# Patient Record
Sex: MALE | Race: WHITE | Employment: OTHER | URBAN - NONMETROPOLITAN AREA
[De-identification: names, ages, dates, MRNs, and addresses within clinical notes are randomized per-mention and may not be internally consistent; named-entity substitution may affect disease eponyms.]

---

## 2017-04-03 PROBLEM — E78.2 MIXED HYPERLIPIDEMIA: Status: ACTIVE | Noted: 2017-04-03

## 2017-04-03 PROBLEM — R55 NEAR SYNCOPE: Status: ACTIVE | Noted: 2017-04-03

## 2017-04-03 PROBLEM — I51.7 MILD LEFT ATRIAL ENLARGEMENT: Status: ACTIVE | Noted: 2017-04-03

## 2017-04-03 PROBLEM — I51.7 MILD RIGHT ATRIAL ENLARGEMENT: Status: ACTIVE | Noted: 2017-04-03

## 2017-04-03 PROBLEM — E11.9 DIABETES MELLITUS TYPE 2, DIET-CONTROLLED (HCC): Status: ACTIVE | Noted: 2017-04-03

## 2017-04-03 PROBLEM — I34.0 MILD MITRAL REGURGITATION BY PRIOR ECHOCARDIOGRAM: Status: ACTIVE | Noted: 2017-04-03

## 2017-04-03 PROBLEM — I48.19 PERSISTENT ATRIAL FIBRILLATION (HCC): Status: ACTIVE | Noted: 2017-04-03

## 2017-05-05 PROBLEM — N13.8 BENIGN PROSTATIC HYPERPLASIA WITH URINARY OBSTRUCTION: Status: ACTIVE | Noted: 2017-03-01

## 2017-05-05 PROBLEM — N28.1 KIDNEY CYSTS: Status: ACTIVE | Noted: 2017-03-01

## 2017-05-05 PROBLEM — N40.1 BENIGN PROSTATIC HYPERPLASIA WITH URINARY OBSTRUCTION: Status: ACTIVE | Noted: 2017-03-01

## 2017-05-23 PROBLEM — I51.7 MILD LEFT ATRIAL ENLARGEMENT: Chronic | Status: ACTIVE | Noted: 2017-04-03

## 2017-05-23 PROBLEM — E11.9 DIABETES MELLITUS TYPE 2, DIET-CONTROLLED (HCC): Chronic | Status: ACTIVE | Noted: 2017-04-03

## 2017-05-23 PROBLEM — I34.0 MILD MITRAL REGURGITATION BY PRIOR ECHOCARDIOGRAM: Chronic | Status: ACTIVE | Noted: 2017-04-03

## 2017-05-23 PROBLEM — R55 NEAR SYNCOPE: Chronic | Status: ACTIVE | Noted: 2017-04-03

## 2017-05-23 PROBLEM — I48.19 PERSISTENT ATRIAL FIBRILLATION (HCC): Chronic | Status: ACTIVE | Noted: 2017-04-03

## 2017-05-23 PROBLEM — E78.2 MIXED HYPERLIPIDEMIA: Chronic | Status: ACTIVE | Noted: 2017-04-03

## 2017-05-23 PROBLEM — I51.7 MILD RIGHT ATRIAL ENLARGEMENT: Chronic | Status: ACTIVE | Noted: 2017-04-03

## 2017-05-23 PROBLEM — N28.1 KIDNEY CYSTS: Chronic | Status: ACTIVE | Noted: 2017-03-01

## 2017-05-25 PROBLEM — K74.60 CIRRHOSIS OF LIVER (HCC): Status: ACTIVE | Noted: 2017-05-25

## 2017-10-23 PROBLEM — I51.7 ATRIAL ENLARGEMENT, BILATERAL: Status: ACTIVE | Noted: 2017-10-23

## 2017-10-23 PROBLEM — I34.0 NON-RHEUMATIC MITRAL REGURGITATION: Status: ACTIVE | Noted: 2017-10-23

## 2017-10-23 PROBLEM — R93.1 ABNORMAL ECHOCARDIOGRAM: Status: ACTIVE | Noted: 2017-10-23

## 2018-05-02 PROBLEM — I48.0 PAF (PAROXYSMAL ATRIAL FIBRILLATION) (HCC): Status: ACTIVE | Noted: 2018-05-02

## 2019-05-03 PROBLEM — R07.9 CHEST PAIN: Status: ACTIVE | Noted: 2019-05-03

## 2019-09-23 PROBLEM — I27.20 PULMONARY HYPERTENSION (HCC): Status: ACTIVE | Noted: 2019-09-23

## 2019-09-23 PROBLEM — Z79.899 LONG TERM CURRENT USE OF ANTIARRHYTHMIC DRUG: Status: ACTIVE | Noted: 2019-09-23

## 2019-09-23 PROBLEM — I48.19 PERSISTENT ATRIAL FIBRILLATION (HCC): Chronic | Status: RESOLVED | Noted: 2017-04-03 | Resolved: 2019-09-23

## 2019-09-23 PROBLEM — Z79.01 LONG TERM (CURRENT) USE OF ANTICOAGULANTS: Status: ACTIVE | Noted: 2019-09-23

## 2019-09-23 PROBLEM — I34.0 MILD MITRAL REGURGITATION BY PRIOR ECHOCARDIOGRAM: Chronic | Status: RESOLVED | Noted: 2017-04-03 | Resolved: 2019-09-23

## 2019-09-23 PROBLEM — I49.3 PVC (PREMATURE VENTRICULAR CONTRACTION): Status: ACTIVE | Noted: 2019-09-23

## 2019-09-23 PROBLEM — Z95.818 STATUS POST PLACEMENT OF IMPLANTABLE LOOP RECORDER: Status: ACTIVE | Noted: 2019-09-23

## 2020-05-26 PROBLEM — I95.0 IDIOPATHIC HYPOTENSION: Status: ACTIVE | Noted: 2020-05-26

## 2024-05-15 ENCOUNTER — TRANSCRIBE ORDERS (OUTPATIENT)
Dept: ADMINISTRATIVE | Age: 79
End: 2024-05-15

## 2024-05-15 ENCOUNTER — HOSPITAL ENCOUNTER (OUTPATIENT)
Age: 79
Discharge: HOME | End: 2024-05-15
Payer: MEDICARE

## 2024-05-15 ENCOUNTER — HOSPITAL ENCOUNTER (OUTPATIENT)
Dept: CT IMAGING | Age: 79
Discharge: HOME | End: 2024-05-17
Attending: FAMILY MEDICINE
Payer: MEDICARE

## 2024-05-15 DIAGNOSIS — R18.8 OTHER ASCITES: ICD-10-CM

## 2024-05-15 DIAGNOSIS — R18.8 OTHER ASCITES: Primary | ICD-10-CM

## 2024-05-15 PROCEDURE — 80048 BASIC METABOLIC PNL TOTAL CA: CPT

## 2024-05-15 PROCEDURE — 36415 COLL VENOUS BLD VENIPUNCTURE: CPT

## 2024-05-15 PROCEDURE — 84443 ASSAY THYROID STIM HORMONE: CPT

## 2024-05-15 PROCEDURE — 74176 CT ABD & PELVIS W/O CONTRAST: CPT

## 2024-05-15 PROCEDURE — 84439 ASSAY OF FREE THYROXINE: CPT

## 2024-05-16 LAB
ANION GAP SERPL CALCULATED.3IONS-SCNC: 10 MMOL/L (ref 9–16)
BUN SERPL-MCNC: 15 MG/DL (ref 8–23)
CALCIUM SERPL-MCNC: 7.5 MG/DL (ref 8.6–10.4)
CHLORIDE SERPL-SCNC: 106 MMOL/L (ref 98–107)
CO2 SERPL-SCNC: 22 MMOL/L (ref 20–31)
CREAT SERPL-MCNC: 1.2 MG/DL (ref 0.7–1.2)
GFR, ESTIMATED: 64 ML/MIN/1.73M2
GLUCOSE SERPL-MCNC: 109 MG/DL (ref 74–99)
POTASSIUM SERPL-SCNC: 4.1 MMOL/L (ref 3.7–5.3)
SODIUM SERPL-SCNC: 138 MMOL/L (ref 136–145)
T4 FREE SERPL-MCNC: 1.7 NG/DL (ref 0.92–1.68)
TSH SERPL DL<=0.05 MIU/L-ACNC: 3.51 UIU/ML (ref 0.27–4.2)

## 2024-05-20 ENCOUNTER — HOSPITAL ENCOUNTER (INPATIENT)
Age: 79
LOS: 4 days | Discharge: HOME | DRG: 292 | End: 2024-05-24
Attending: EMERGENCY MEDICINE | Admitting: STUDENT IN AN ORGANIZED HEALTH CARE EDUCATION/TRAINING PROGRAM
Payer: MEDICARE

## 2024-05-20 ENCOUNTER — APPOINTMENT (OUTPATIENT)
Dept: GENERAL RADIOLOGY | Age: 79
DRG: 292 | End: 2024-05-20
Payer: MEDICARE

## 2024-05-20 DIAGNOSIS — K74.60 CIRRHOSIS OF LIVER WITH ASCITES, UNSPECIFIED HEPATIC CIRRHOSIS TYPE (HCC): ICD-10-CM

## 2024-05-20 DIAGNOSIS — I50.21 ACUTE SYSTOLIC CONGESTIVE HEART FAILURE (HCC): Primary | ICD-10-CM

## 2024-05-20 DIAGNOSIS — R60.0 BILATERAL LOWER EXTREMITY EDEMA: ICD-10-CM

## 2024-05-20 DIAGNOSIS — R18.8 CIRRHOSIS OF LIVER WITH ASCITES, UNSPECIFIED HEPATIC CIRRHOSIS TYPE (HCC): ICD-10-CM

## 2024-05-20 PROBLEM — R60.1 ANASARCA: Status: ACTIVE | Noted: 2024-05-20

## 2024-05-20 PROBLEM — I95.89 CHRONIC HYPOTENSION: Status: ACTIVE | Noted: 2024-05-20

## 2024-05-20 PROBLEM — E87.6 HYPOKALEMIA: Status: ACTIVE | Noted: 2024-05-20

## 2024-05-20 PROBLEM — E03.9 HYPOTHYROIDISM: Status: ACTIVE | Noted: 2024-05-20

## 2024-05-20 PROBLEM — I48.91 ATRIAL FIBRILLATION (HCC): Status: ACTIVE | Noted: 2024-05-20

## 2024-05-20 PROBLEM — I50.23 ACUTE ON CHRONIC CLINICAL SYSTOLIC HEART FAILURE (HCC): Status: ACTIVE | Noted: 2024-05-20

## 2024-05-20 LAB
ALBUMIN SERPL-MCNC: 2.9 G/DL (ref 3.5–5.2)
ALBUMIN/GLOB SERPL: 1.2 {RATIO} (ref 1–2.5)
ALP SERPL-CCNC: 165 U/L (ref 40–129)
ALT SERPL-CCNC: 20 U/L (ref 5–41)
ANION GAP SERPL CALCULATED.3IONS-SCNC: 9 MMOL/L (ref 9–17)
AST SERPL-CCNC: 30 U/L
BASOPHILS # BLD: 0.04 K/UL (ref 0–0.2)
BASOPHILS NFR BLD: 1 % (ref 0–2)
BILIRUB SERPL-MCNC: 0.3 MG/DL (ref 0.3–1.2)
BNP SERPL-MCNC: 5021 PG/ML
BUN SERPL-MCNC: 13 MG/DL (ref 8–23)
CALCIUM SERPL-MCNC: 7.3 MG/DL (ref 8.6–10.4)
CHLORIDE SERPL-SCNC: 105 MMOL/L (ref 98–107)
CO2 SERPL-SCNC: 31 MMOL/L (ref 20–31)
CREAT SERPL-MCNC: 1.2 MG/DL (ref 0.7–1.2)
EOSINOPHIL # BLD: 0.04 K/UL (ref 0–0.4)
EOSINOPHILS RELATIVE PERCENT: 1 % (ref 1–4)
ERYTHROCYTE [DISTWIDTH] IN BLOOD BY AUTOMATED COUNT: 16.1 % (ref 12.5–15.4)
GFR, ESTIMATED: 61 ML/MIN/1.73M2
GLUCOSE SERPL-MCNC: 108 MG/DL (ref 70–99)
HCT VFR BLD AUTO: 26.7 % (ref 41–53)
HGB BLD-MCNC: 8.9 G/DL (ref 13.5–17.5)
LYMPHOCYTES NFR BLD: 0.22 K/UL (ref 1–4.8)
LYMPHOCYTES RELATIVE PERCENT: 5 % (ref 24–44)
MCH RBC QN AUTO: 30.1 PG (ref 26–34)
MCHC RBC AUTO-ENTMCNC: 33.3 G/DL (ref 31–37)
MCV RBC AUTO: 90.5 FL (ref 80–100)
MONOCYTES NFR BLD: 0.31 K/UL (ref 0.1–0.8)
MONOCYTES NFR BLD: 7 % (ref 1–7)
MORPHOLOGY: NORMAL
NEUTROPHILS NFR BLD: 86 % (ref 36–66)
NEUTS SEG NFR BLD: 3.79 K/UL (ref 1.8–7.7)
PLATELET # BLD AUTO: 225 K/UL (ref 140–450)
PMV BLD AUTO: 7.4 FL (ref 6–12)
POTASSIUM SERPL-SCNC: 2.8 MMOL/L (ref 3.7–5.3)
POTASSIUM SERPL-SCNC: 2.9 MMOL/L (ref 3.7–5.3)
PROT SERPL-MCNC: 5.3 G/DL (ref 6.4–8.3)
RBC # BLD AUTO: 2.95 M/UL (ref 4.5–5.9)
SODIUM SERPL-SCNC: 145 MMOL/L (ref 135–144)
TROPONIN I SERPL HS-MCNC: 24 NG/L (ref 0–22)
TROPONIN I SERPL HS-MCNC: 27 NG/L (ref 0–22)
WBC OTHER # BLD: 4.4 K/UL (ref 3.5–11)

## 2024-05-20 PROCEDURE — 99222 1ST HOSP IP/OBS MODERATE 55: CPT | Performed by: STUDENT IN AN ORGANIZED HEALTH CARE EDUCATION/TRAINING PROGRAM

## 2024-05-20 PROCEDURE — 6360000002 HC RX W HCPCS: Performed by: EMERGENCY MEDICINE

## 2024-05-20 PROCEDURE — 84484 ASSAY OF TROPONIN QUANT: CPT

## 2024-05-20 PROCEDURE — 80053 COMPREHEN METABOLIC PANEL: CPT

## 2024-05-20 PROCEDURE — 83880 ASSAY OF NATRIURETIC PEPTIDE: CPT

## 2024-05-20 PROCEDURE — 6360000002 HC RX W HCPCS: Performed by: STUDENT IN AN ORGANIZED HEALTH CARE EDUCATION/TRAINING PROGRAM

## 2024-05-20 PROCEDURE — 84132 ASSAY OF SERUM POTASSIUM: CPT

## 2024-05-20 PROCEDURE — 2580000003 HC RX 258: Performed by: STUDENT IN AN ORGANIZED HEALTH CARE EDUCATION/TRAINING PROGRAM

## 2024-05-20 PROCEDURE — 6370000000 HC RX 637 (ALT 250 FOR IP)

## 2024-05-20 PROCEDURE — 6370000000 HC RX 637 (ALT 250 FOR IP): Performed by: STUDENT IN AN ORGANIZED HEALTH CARE EDUCATION/TRAINING PROGRAM

## 2024-05-20 PROCEDURE — 96374 THER/PROPH/DIAG INJ IV PUSH: CPT

## 2024-05-20 PROCEDURE — 99285 EMERGENCY DEPT VISIT HI MDM: CPT

## 2024-05-20 PROCEDURE — 1210000000 HC MED SURG R&B

## 2024-05-20 PROCEDURE — 85025 COMPLETE CBC W/AUTO DIFF WBC: CPT

## 2024-05-20 PROCEDURE — 93005 ELECTROCARDIOGRAM TRACING: CPT

## 2024-05-20 PROCEDURE — 6360000002 HC RX W HCPCS

## 2024-05-20 PROCEDURE — 71045 X-RAY EXAM CHEST 1 VIEW: CPT

## 2024-05-20 PROCEDURE — 36415 COLL VENOUS BLD VENIPUNCTURE: CPT

## 2024-05-20 RX ORDER — MAGNESIUM SULFATE IN WATER 40 MG/ML
2000 INJECTION, SOLUTION INTRAVENOUS PRN
Status: DISCONTINUED | OUTPATIENT
Start: 2024-05-20 | End: 2024-05-24 | Stop reason: HOSPADM

## 2024-05-20 RX ORDER — AMIODARONE HYDROCHLORIDE 200 MG/1
200 TABLET ORAL DAILY
Status: DISCONTINUED | OUTPATIENT
Start: 2024-05-21 | End: 2024-05-20

## 2024-05-20 RX ORDER — PANTOPRAZOLE SODIUM 40 MG/1
40 TABLET, DELAYED RELEASE ORAL
Status: DISCONTINUED | OUTPATIENT
Start: 2024-05-21 | End: 2024-05-24 | Stop reason: HOSPADM

## 2024-05-20 RX ORDER — TAMSULOSIN HYDROCHLORIDE 0.4 MG/1
0.4 CAPSULE ORAL DAILY
Status: DISCONTINUED | OUTPATIENT
Start: 2024-05-20 | End: 2024-05-24 | Stop reason: HOSPADM

## 2024-05-20 RX ORDER — MIDODRINE HYDROCHLORIDE 5 MG/1
10 TABLET ORAL 3 TIMES DAILY
Status: DISCONTINUED | OUTPATIENT
Start: 2024-05-21 | End: 2024-05-24 | Stop reason: HOSPADM

## 2024-05-20 RX ORDER — ACETAMINOPHEN 325 MG/1
650 TABLET ORAL EVERY 6 HOURS PRN
Status: DISCONTINUED | OUTPATIENT
Start: 2024-05-20 | End: 2024-05-24 | Stop reason: HOSPADM

## 2024-05-20 RX ORDER — SODIUM CHLORIDE 9 MG/ML
INJECTION, SOLUTION INTRAVENOUS PRN
Status: DISCONTINUED | OUTPATIENT
Start: 2024-05-20 | End: 2024-05-24 | Stop reason: HOSPADM

## 2024-05-20 RX ORDER — LEVOTHYROXINE SODIUM 0.12 MG/1
125 TABLET ORAL DAILY
Status: ON HOLD | COMMUNITY
End: 2024-05-24 | Stop reason: HOSPADM

## 2024-05-20 RX ORDER — ONDANSETRON 4 MG/1
4 TABLET, ORALLY DISINTEGRATING ORAL EVERY 8 HOURS PRN
Status: DISCONTINUED | OUTPATIENT
Start: 2024-05-20 | End: 2024-05-24 | Stop reason: HOSPADM

## 2024-05-20 RX ORDER — FUROSEMIDE 10 MG/ML
40 INJECTION INTRAMUSCULAR; INTRAVENOUS ONCE
Status: COMPLETED | OUTPATIENT
Start: 2024-05-20 | End: 2024-05-20

## 2024-05-20 RX ORDER — GABAPENTIN 100 MG/1
100 CAPSULE ORAL 3 TIMES DAILY
Status: DISCONTINUED | OUTPATIENT
Start: 2024-05-20 | End: 2024-05-24 | Stop reason: HOSPADM

## 2024-05-20 RX ORDER — ATORVASTATIN CALCIUM 40 MG/1
40 TABLET, FILM COATED ORAL DAILY
Status: DISCONTINUED | COMMUNITY
Start: 2024-05-21 | End: 2024-05-24 | Stop reason: HOSPADM

## 2024-05-20 RX ORDER — POTASSIUM CHLORIDE 20 MEQ/1
20 TABLET, EXTENDED RELEASE ORAL
Status: DISCONTINUED | OUTPATIENT
Start: 2024-05-20 | End: 2024-05-24 | Stop reason: HOSPADM

## 2024-05-20 RX ORDER — SODIUM CHLORIDE 0.9 % (FLUSH) 0.9 %
5-40 SYRINGE (ML) INJECTION EVERY 12 HOURS SCHEDULED
Status: DISCONTINUED | OUTPATIENT
Start: 2024-05-20 | End: 2024-05-24 | Stop reason: HOSPADM

## 2024-05-20 RX ORDER — POTASSIUM CHLORIDE 7.45 MG/ML
10 INJECTION INTRAVENOUS PRN
Status: DISCONTINUED | OUTPATIENT
Start: 2024-05-20 | End: 2024-05-24 | Stop reason: HOSPADM

## 2024-05-20 RX ORDER — LEVOTHYROXINE SODIUM 0.05 MG/1
50 TABLET ORAL DAILY
Status: DISCONTINUED | OUTPATIENT
Start: 2024-05-21 | End: 2024-05-20

## 2024-05-20 RX ORDER — AMIODARONE HYDROCHLORIDE 200 MG/1
100 TABLET ORAL DAILY
Status: DISCONTINUED | OUTPATIENT
Start: 2024-05-21 | End: 2024-05-24 | Stop reason: HOSPADM

## 2024-05-20 RX ORDER — POTASSIUM CHLORIDE 7.45 MG/ML
10 INJECTION INTRAVENOUS ONCE
Status: COMPLETED | OUTPATIENT
Start: 2024-05-20 | End: 2024-05-20

## 2024-05-20 RX ORDER — ENOXAPARIN SODIUM 100 MG/ML
30 INJECTION SUBCUTANEOUS 2 TIMES DAILY
Status: DISCONTINUED | OUTPATIENT
Start: 2024-05-20 | End: 2024-05-20

## 2024-05-20 RX ORDER — LEVOTHYROXINE SODIUM 0.07 MG/1
75 TABLET ORAL DAILY
Status: DISCONTINUED | OUTPATIENT
Start: 2024-05-21 | End: 2024-05-24 | Stop reason: HOSPADM

## 2024-05-20 RX ORDER — ONDANSETRON 2 MG/ML
4 INJECTION INTRAMUSCULAR; INTRAVENOUS EVERY 6 HOURS PRN
Status: DISCONTINUED | OUTPATIENT
Start: 2024-05-20 | End: 2024-05-24 | Stop reason: HOSPADM

## 2024-05-20 RX ORDER — MIDODRINE HYDROCHLORIDE 5 MG/1
5 TABLET ORAL 3 TIMES DAILY
Status: DISCONTINUED | OUTPATIENT
Start: 2024-05-20 | End: 2024-05-20

## 2024-05-20 RX ORDER — LEVOTHYROXINE SODIUM 0.07 MG/1
75 TABLET ORAL DAILY
COMMUNITY

## 2024-05-20 RX ORDER — POTASSIUM CHLORIDE 20 MEQ/1
40 TABLET, EXTENDED RELEASE ORAL ONCE
Status: COMPLETED | OUTPATIENT
Start: 2024-05-20 | End: 2024-05-20

## 2024-05-20 RX ORDER — SODIUM CHLORIDE 0.9 % (FLUSH) 0.9 %
5-40 SYRINGE (ML) INJECTION PRN
Status: DISCONTINUED | OUTPATIENT
Start: 2024-05-20 | End: 2024-05-24 | Stop reason: HOSPADM

## 2024-05-20 RX ORDER — ACETAMINOPHEN 650 MG/1
650 SUPPOSITORY RECTAL EVERY 6 HOURS PRN
Status: DISCONTINUED | OUTPATIENT
Start: 2024-05-20 | End: 2024-05-24 | Stop reason: HOSPADM

## 2024-05-20 RX ORDER — FUROSEMIDE 10 MG/ML
20 INJECTION INTRAMUSCULAR; INTRAVENOUS DAILY
Status: DISCONTINUED | OUTPATIENT
Start: 2024-05-21 | End: 2024-05-24 | Stop reason: HOSPADM

## 2024-05-20 RX ORDER — POTASSIUM CHLORIDE 20 MEQ/1
40 TABLET, EXTENDED RELEASE ORAL PRN
Status: DISCONTINUED | OUTPATIENT
Start: 2024-05-20 | End: 2024-05-24 | Stop reason: HOSPADM

## 2024-05-20 RX ORDER — POLYETHYLENE GLYCOL 3350 17 G/17G
17 POWDER, FOR SOLUTION ORAL DAILY PRN
Status: DISCONTINUED | OUTPATIENT
Start: 2024-05-20 | End: 2024-05-24 | Stop reason: HOSPADM

## 2024-05-20 RX ADMIN — Medication 10 MEQ: at 15:06

## 2024-05-20 RX ADMIN — MIDODRINE HYDROCHLORIDE 5 MG: 5 TABLET ORAL at 18:16

## 2024-05-20 RX ADMIN — GABAPENTIN 100 MG: 100 CAPSULE ORAL at 20:52

## 2024-05-20 RX ADMIN — POTASSIUM CHLORIDE 10 MEQ: 7.46 INJECTION, SOLUTION INTRAVENOUS at 23:03

## 2024-05-20 RX ADMIN — FUROSEMIDE 40 MG: 10 INJECTION, SOLUTION INTRAVENOUS at 15:05

## 2024-05-20 RX ADMIN — SODIUM CHLORIDE, PRESERVATIVE FREE 10 ML: 5 INJECTION INTRAVENOUS at 20:52

## 2024-05-20 RX ADMIN — TAMSULOSIN HYDROCHLORIDE 0.4 MG: 0.4 CAPSULE ORAL at 18:17

## 2024-05-20 RX ADMIN — POTASSIUM CHLORIDE 10 MEQ: 7.46 INJECTION, SOLUTION INTRAVENOUS at 20:58

## 2024-05-20 RX ADMIN — POTASSIUM CHLORIDE 40 MEQ: 1500 TABLET, EXTENDED RELEASE ORAL at 15:42

## 2024-05-20 RX ADMIN — POTASSIUM CHLORIDE 20 MEQ: 1500 TABLET, EXTENDED RELEASE ORAL at 20:52

## 2024-05-20 RX ADMIN — APIXABAN 5 MG: 5 TABLET, FILM COATED ORAL at 20:52

## 2024-05-20 ASSESSMENT — PAIN - FUNCTIONAL ASSESSMENT
PAIN_FUNCTIONAL_ASSESSMENT: NONE - DENIES PAIN
PAIN_FUNCTIONAL_ASSESSMENT: NONE - DENIES PAIN

## 2024-05-20 NOTE — ED PROVIDER NOTES
[address]  Emergency Department Encounter  Mid Level Provider     Pt Name: Eliceo Madden  MRN: [id number]  Birthdate 1945  Date of evaluation: 5/20/24  PCP:  Agata Wilde DO    CHIEF COMPLAINT       Chief Complaint   Patient presents with    Bloated     Started furosemide  on Thursday- progressive water retention/SOB- denies CP. Pt reports abd hernia-into groin. Seeing surgeon        HISTORY OF PRESENT ILLNESS  (Location/Symptom, Timing/Onset,Context/Setting, Quality, Duration, Modifying Factors, Severity.)      Eliceo Madden is a 79 y.o. male who presents with complaints of bilateral lower extremity edema.  Patient was recently started on Lasix by his primary care provider 40 mg daily for this.  He has been taking it daily since however has developed progressive worsening of the bilateral lower extremity edema starting in the feet going up into the groin and abdomen.  Patient reports exertional dyspnea associated with increased fluid.  Denies chest pain, fevers, chills, nausea, vomiting.    He does have a history of coronary artery disease, A-fib, hyperlipidemia.  Also being worked up for a large hernia of the lower abdomen she recently had a CT of the abdomen pelvis for and has follow-up with general surgery on June 11.    PAST MEDICAL /SURGICAL / SOCIAL / FAMILY HISTORY      has a past medical history of Bowel obstruction (HCC), BPH (benign prostatic hyperplasia), Diffuse large B cell lymphoma (HCC), and Hyperlipidemia.     has a past surgical history that includes Cholecystectomy; Vasectomy; Pancreas Biopsy; and Colonoscopy (03/2017).    Social History     Socioeconomic History    Marital status:      Spouse name: Not on file    Number of children: Not on file    Years of education: Not on file    Highest education level: Not on file   Occupational History    Not on file   Tobacco Use    Smoking status: Former     Current packs/day: 0.00     Types: Cigarettes     Quit date: 4/3/1972      pulses are 2+ on the right side and 2+ on the left side.        Dorsalis pedis pulses are 1+ on the right side and 1+ on the left side.        Posterior tibial pulses are 1+ on the right side and 1+ on the left side.      Heart sounds: Heart sounds are distant.   Pulmonary:      Effort: Pulmonary effort is normal. No respiratory distress.      Breath sounds: Decreased air movement present.   Abdominal:      Palpations: Abdomen is soft.   Musculoskeletal:      Cervical back: Normal range of motion.      Right lower leg: 3+ Pitting Edema present.      Left lower leg: 3+ Pitting Edema present.   Skin:     General: Skin is warm and dry.      Capillary Refill: Capillary refill takes less than 2 seconds.   Neurological:      General: No focal deficit present.      Mental Status: He is alert and oriented to person, place, and time.   Psychiatric:         Mood and Affect: Mood normal.         EMERGENCY DEPARTMENT COURSE AND MEDICAL DECISION MAKING     DIFFERENTIAL DIAGNOSIS    Upper airway causes: angioedema, anaphylaxis, infections or FB of the upper airway  Pulmonary causes: COPD/Asthma/Emphysema, Pneumonia, pneumothorax, pulmonary edema, pleural effusion or pulmonary embolism  Cardiac causes: Heart failure, ACS, cardiac arrhythmias  Toxins: Cyanide, Carbon monoxide, drugs, DKA  Neurologic causes: Guillain-Barré , stroke     ED COURSE:    ED Course as of 05/21/24 0050   Mon May 20, 2024   1445 Potassium 2.8, BNP elevated 5021, troponin 27, hemoglobin 8.9.  Replacement potassium ordered, Lasix ordered, patient remains in the waiting room at this time.  X-ray shows small bilateral pleural effusions with mid bibasilar atelectasis. []   1534 Hospitalist contacted for admission [AH]      ED Course User Index  [AH] Yann Sofia, APRN - CNP       Medical Decision Making:    This patient was seen and evaluated in the Emergency Department for complaints of bilateral lower extremity edema that has progressively worsened over the

## 2024-05-20 NOTE — H&P
@[email or web address]@    [facility]   IN-PATIENT SERVICE   [facility]    HISTORY AND PHYSICAL EXAMINATION            Date:   5/20/2024  Patient name:  Eliceo Madden  Date of admission:  5/20/2024  1:05 PM  MRN:   [id number]  Account:  [id number]  YOB: 1945  PCP:    Agata Wilde DO  Room:   [address] [place] [address]  Code Status:    Full Code    Chief Complaint:     Chief Complaint   Patient presents with    Bloated     Started furosemide  on Thursday- progressive water retention/SOB- denies CP. Pt reports abd hernia-into groin. Seeing surgeon        History Obtained From:     patient, electronic medical record    History of Present Illness:     Eliceo Madden is a 79 y.o. Non- / non  male who presents with Bloated (Started furosemide  on Thursday- progressive water retention/SOB- denies CP. Pt reports abd hernia-into groin. Seeing surgeon )   and is admitted to the hospital for the management of Bilateral lower extremity edema.    79-year-old male with history of large B-cell lymphoma, A-fib on Eliquis, liver cirrhosis, hypotension on midodrine/Florinef at home, BPH, hypothyroidism, CKD?  Came in with bilateral lower extremity swelling and abdominal distention extending to his groin, going on for last 2 to 3 weeks, patient was prescribed Lasix last Thursday by PCP, had minimal improvement which is why he decided to come to the ER because he could not meet his PCP today because of being out of town.  States that he feels mild shortness of breath, denies any chest pain, abdominal pain, fever, chills, vomiting, diarrhea, headache, dizziness, lightheadedness, urinary complaints.  Found to have low potassium, high proBNP, elevated troponins, elevated ALP on arrival  Chest x-ray with small bilateral pleural effusion.  Patient received a dose of Lasix in ER, also given potassium replacement, admitted to our service for further evaluation    Past Medical History:     Past Medical  area  Extremities: Positive for lower extremity edema   psych: normal affect     Investigations:      Laboratory Testing:  Recent Results (from the past 24 hour(s))   CBC with Auto Differential    Collection Time: 05/20/24  1:27 PM   Result Value Ref Range    WBC 4.4 3.5 - 11.0 k/uL    RBC 2.95 (L) 4.5 - 5.9 m/uL    Hemoglobin 8.9 (L) 13.5 - 17.5 g/dL    Hematocrit 26.7 (L) 41 - 53 %    MCV 90.5 80 - 100 fL    MCH 30.1 26 - 34 pg    MCHC 33.3 31 - 37 g/dL    RDW 16.1 (H) 12.5 - 15.4 %    Platelets 225 140 - 450 k/uL    MPV 7.4 6.0 - 12.0 fL    Neutrophils % 86 (H) 36 - 66 %    Lymphocytes % 5 (L) 24 - 44 %    Monocytes % 7 1 - 7 %    Eosinophils % 1 1 - 4 %    Basophils % 1 0 - 2 %    Neutrophils Absolute 3.79 1.8 - 7.7 k/uL    Lymphocytes Absolute 0.22 (L) 1.0 - 4.8 k/uL    Monocytes Absolute 0.31 0.1 - 0.8 k/uL    Eosinophils Absolute 0.04 0.0 - 0.4 k/uL    Basophils Absolute 0.04 0.0 - 0.2 k/uL    Morphology Normal    CMP    Collection Time: 05/20/24  1:27 PM   Result Value Ref Range    Sodium 145 (H) 135 - 144 mmol/L    Potassium 2.8 (LL) 3.7 - 5.3 mmol/L    Chloride 105 98 - 107 mmol/L    CO2 31 20 - 31 mmol/L    Anion Gap 9 9 - 17 mmol/L    Glucose 108 (H) 70 - 99 mg/dL    BUN 13 8 - 23 mg/dL    Creatinine 1.2 0.7 - 1.2 mg/dL    Est, Glom Filt Rate 61 >60 mL/min/1.73m2    Calcium 7.3 (L) 8.6 - 10.4 mg/dL    Total Protein 5.3 (L) 6.4 - 8.3 g/dL    Albumin 2.9 (L) 3.5 - 5.2 g/dL    Albumin/Globulin Ratio 1.2 1.0 - 2.5    Total Bilirubin 0.3 0.3 - 1.2 mg/dL    Alkaline Phosphatase 165 (H) 40 - 129 U/L    ALT 20 5 - 41 U/L    AST 30 <40 U/L   Troponin    Collection Time: 05/20/24  1:27 PM   Result Value Ref Range    Troponin, High Sensitivity 27 (H) 0 - 22 ng/L   Brain Natriuretic Peptide    Collection Time: 05/20/24  1:27 PM   Result Value Ref Range    Pro-BNP 5,021 (H) <300 pg/mL   Troponin    Collection Time: 05/20/24  3:08 PM   Result Value Ref Range    Troponin, High Sensitivity 24 (H) 0 - 22 ng/L

## 2024-05-20 NOTE — ED PROVIDER NOTES
[facility] Emergency Department  [address]  [place] [address]  Phone: [phone]  Fax: [phone]      Attending Physician Attestation    I performed a history and physical examination of the patient and discussed management with the mid level provideer. I reviewed the mid level provider's note and agree with the documented findings and plan of care. Any areas of disagreement are noted on the chart. I was personally present for the key portions of any procedures. I have documented in the chart those procedures where I was not present during the key portions. I have reviewed the emergency nurses triage note. I agree with the chief complaint, past medical history, past surgical history, allergies, medications, social and family history as documented unless otherwise noted below. Documentation of the HPI, Physical Exam and Medical Decision Making performed by mid level providers is based on my personal performance of the HPI, PE and MDM. For Physician Assistant/ Nurse Practitioner cases/documentation I have personally evaluated this patient and have completed at least one if not all key elements of the E/M (history, physical exam, and MDM). Additional findings are as noted.      CHIEF COMPLAINT       Chief Complaint   Patient presents with    Bloated     Started furosemide  on Thursday- progressive water retention/SOB- denies CP. Pt reports abd hernia-into groin. Seeing surgeon          PAST MEDICAL HISTORY    has a past medical history of Bowel obstruction (HCC), BPH (benign prostatic hyperplasia), Diffuse large B cell lymphoma (HCC), and Hyperlipidemia.    SURGICAL HISTORY      has a past surgical history that includes Cholecystectomy; Vasectomy; Pancreas Biopsy; and Colonoscopy (03/2017).    CURRENT MEDICATIONS       Previous Medications    ACETAMINOPHEN (TYLENOL) 500 MG TABLET    Take 1 tablet by mouth every 4 hours as needed for Pain    AMIODARONE (CORDARONE) 200 MG TABLET

## 2024-05-20 NOTE — PLAN OF CARE
Problem: Discharge Planning  Goal: Discharge to home or other facility with appropriate resources  Outcome: Progressing     Problem: Safety - Adult  Goal: Free from fall injury  Outcome: Progressing     Problem: Skin/Tissue Integrity  Goal: Absence of new skin breakdown  Description: 1.  Monitor for areas of redness and/or skin breakdown  2.  Assess vascular access sites hourly  3.  Every 4-6 hours minimum:  Change oxygen saturation probe site  4.  Every 4-6 hours:  If on nasal continuous positive airway pressure, respiratory therapy assess nares and determine need for appliance change or resting period.  Outcome: Progressing      Lymphedema: Care Instructions  Your Care Instructions     Lymphedema is fluid that builds up in the arms or legs. It is often caused by surgery to remove lymph nodes during cancer treatment, especially breast cancer surgery, which can cause fluid to build up in the arm. It can happen after radiation treatment to an area that involves lymph nodes. It also can be caused by a fractured bone or surgery to fix a fracture. And some medicines also can cause lymphedema. Some people get it for unknown reasons. Normally, lymph nodes trap bacteria and other substances as fluid flows through them. Then, the white cells in the body's defense, or immune, system can destroy the substances. But if there are few or no lymph nodes--or if the lymph system in an arm or leg has been damaged--fluid can build up in the affected arm or leg. You can take simple steps at home to help treat or prevent fluid buildup. Treatment may include raising the arm or leg to let gravity drain the fluid. You also can wear compression stockings or sleeves. Follow-up care is a key part of your treatment and safety. Be sure to make and go to all appointments, and call your doctor if you are having problems. It's also a good idea to know your test results and keep a list of the medicines you take. How can you care for yourself at home? · Wear a compression stocking or sleeve as your doctor suggests. It can help keep fluid from pooling in an arm or leg. Wear it during air travel. · Prop up the swollen arm or leg on a pillow anytime you sit or lie down. Try to keep it above the level of your heart. This will help reduce swelling. · Avoid crossing your legs if your legs are swollen. · Get some exercise on most days of the week. Increase the intensity of exercise slowly. Water aerobics can help reduce swelling by helping fluid move around. Wear your compression stocking or sleeve during exercise, but not during water exercise.   · See a physical therapist. He or she can teach you how to do self-massage to help fluid move around. You also can learn what activities would be best for you. · Keep your feet clean and wear clean socks or stockings every day. Check your feet often for signs of infection, such as redness or heat. Do not walk barefoot. · If you have had lymph nodes removed from under your arm:  ? Do not have blood drawn from the arm on the side of the lymph node surgery. ? Do not allow a blood pressure cuff to be placed on that arm. If you are in the hospital, make sure your nurse and other hospital staff know of your condition. ? Wear gloves when gardening or doing other activities that may lead to cuts on your fingers or hands. · If you have had lymph nodes removed from your groin:  ? Bathe your feet daily in lukewarm, not hot, water. Use a mild soap that has a moisturizer, or use a moisturizer separately. ? Check your feet for blisters or cuts. ? Wear comfortable and supportive shoes that fit properly. ? Wear the correct size of panty hose and stockings. Avoid garters or knee-high or thigh-high stockings. · Ask your doctor how to treat any cuts, scratches, insect bites, or other injuries that may occur. · Use sunscreen and insect repellent when outdoors to protect your skin from sunburn and insect bites. · Wear medical alert jewelry that says you have lymphedema. You can buy these at most drugstores and on the Internet. When should you call for help? Call your doctor now or seek immediate medical care if:    · You have signs of infection, such as:  ? Increased pain, swelling, warmth, or redness. ? Red streaks leading from the area. ? Pus draining from the area. ? A fever. Watch closely for changes in your health, and be sure to contact your doctor if:    · You have new or worse symptoms from lymphedema.     · You do not get better as expected. Where can you learn more?   Go to http://www.gray.com/  Enter V398 in the search box to learn more about \"Lymphedema: Care Instructions. \"  Current as of: September 8, 2021               Content Version: 13.2  © 9034-0237 [employer]. Care instructions adapted under license by [employer] (which disclaims liability or warranty for this information). If you have questions about a medical condition or this instruction, always ask your healthcare professional. [place] [address] any warranty or liability for your use of this information.

## 2024-05-21 ENCOUNTER — APPOINTMENT (OUTPATIENT)
Dept: ULTRASOUND IMAGING | Age: 79
DRG: 292 | End: 2024-05-21
Attending: STUDENT IN AN ORGANIZED HEALTH CARE EDUCATION/TRAINING PROGRAM
Payer: MEDICARE

## 2024-05-21 LAB
ANION GAP SERPL CALCULATED.3IONS-SCNC: 6 MMOL/L (ref 9–17)
BASOPHILS # BLD: 0 K/UL (ref 0–0.2)
BASOPHILS NFR BLD: 0 % (ref 0–2)
BUN SERPL-MCNC: 13 MG/DL (ref 8–23)
CALCIUM SERPL-MCNC: 6.8 MG/DL (ref 8.6–10.4)
CHLORIDE SERPL-SCNC: 106 MMOL/L (ref 98–107)
CO2 SERPL-SCNC: 30 MMOL/L (ref 20–31)
CREAT SERPL-MCNC: 1.2 MG/DL (ref 0.7–1.2)
EKG ATRIAL RATE: 77 BPM
EKG Q-T INTERVAL: 426 MS
EKG QRS DURATION: 122 MS
EKG QTC CALCULATION (BAZETT): 475 MS
EKG R AXIS: -49 DEGREES
EKG T AXIS: 111 DEGREES
EKG VENTRICULAR RATE: 75 BPM
EOSINOPHIL # BLD: 0.04 K/UL (ref 0–0.4)
EOSINOPHILS RELATIVE PERCENT: 1 % (ref 1–4)
ERYTHROCYTE [DISTWIDTH] IN BLOOD BY AUTOMATED COUNT: 15.9 % (ref 12.5–15.4)
GFR, ESTIMATED: 61 ML/MIN/1.73M2
GLUCOSE SERPL-MCNC: 85 MG/DL (ref 70–99)
HAV IGM SERPL QL IA: NONREACTIVE
HBV CORE IGM SERPL QL IA: NONREACTIVE
HBV SURFACE AG SERPL QL IA: NONREACTIVE
HCT VFR BLD AUTO: 22.2 % (ref 41–53)
HCV AB SERPL QL IA: NONREACTIVE
HGB BLD-MCNC: 7.4 G/DL (ref 13.5–17.5)
LYMPHOCYTES NFR BLD: 0.63 K/UL (ref 1–4.8)
LYMPHOCYTES RELATIVE PERCENT: 18 % (ref 24–44)
MAGNESIUM SERPL-MCNC: 1.6 MG/DL (ref 1.6–2.6)
MCH RBC QN AUTO: 30.3 PG (ref 26–34)
MCHC RBC AUTO-ENTMCNC: 33.4 G/DL (ref 31–37)
MCV RBC AUTO: 90.6 FL (ref 80–100)
MONOCYTES NFR BLD: 0.07 K/UL (ref 0.1–0.8)
MONOCYTES NFR BLD: 2 % (ref 1–7)
MORPHOLOGY: NORMAL
NEUTROPHILS NFR BLD: 79 % (ref 36–66)
NEUTS SEG NFR BLD: 2.76 K/UL (ref 1.8–7.7)
PLATELET # BLD AUTO: 188 K/UL (ref 140–450)
PMV BLD AUTO: 7.2 FL (ref 6–12)
POTASSIUM SERPL-SCNC: 3.5 MMOL/L (ref 3.7–5.3)
POTASSIUM SERPL-SCNC: 3.6 MMOL/L (ref 3.7–5.3)
RBC # BLD AUTO: 2.45 M/UL (ref 4.5–5.9)
SODIUM SERPL-SCNC: 142 MMOL/L (ref 135–144)
WBC OTHER # BLD: 3.5 K/UL (ref 3.5–11)

## 2024-05-21 PROCEDURE — 80048 BASIC METABOLIC PNL TOTAL CA: CPT

## 2024-05-21 PROCEDURE — 1210000000 HC MED SURG R&B

## 2024-05-21 PROCEDURE — 6370000000 HC RX 637 (ALT 250 FOR IP): Performed by: STUDENT IN AN ORGANIZED HEALTH CARE EDUCATION/TRAINING PROGRAM

## 2024-05-21 PROCEDURE — 83735 ASSAY OF MAGNESIUM: CPT

## 2024-05-21 PROCEDURE — 99232 SBSQ HOSP IP/OBS MODERATE 35: CPT | Performed by: STUDENT IN AN ORGANIZED HEALTH CARE EDUCATION/TRAINING PROGRAM

## 2024-05-21 PROCEDURE — 85025 COMPLETE CBC W/AUTO DIFF WBC: CPT

## 2024-05-21 PROCEDURE — 97165 OT EVAL LOW COMPLEX 30 MIN: CPT

## 2024-05-21 PROCEDURE — 80074 ACUTE HEPATITIS PANEL: CPT

## 2024-05-21 PROCEDURE — 76705 ECHO EXAM OF ABDOMEN: CPT

## 2024-05-21 PROCEDURE — 36415 COLL VENOUS BLD VENIPUNCTURE: CPT

## 2024-05-21 PROCEDURE — 6360000002 HC RX W HCPCS: Performed by: STUDENT IN AN ORGANIZED HEALTH CARE EDUCATION/TRAINING PROGRAM

## 2024-05-21 PROCEDURE — 84132 ASSAY OF SERUM POTASSIUM: CPT

## 2024-05-21 PROCEDURE — 2580000003 HC RX 258: Performed by: STUDENT IN AN ORGANIZED HEALTH CARE EDUCATION/TRAINING PROGRAM

## 2024-05-21 RX ORDER — POTASSIUM CHLORIDE 20 MEQ/1
40 TABLET, EXTENDED RELEASE ORAL ONCE
Status: COMPLETED | OUTPATIENT
Start: 2024-05-21 | End: 2024-05-21

## 2024-05-21 RX ADMIN — AMIODARONE HYDROCHLORIDE 100 MG: 200 TABLET ORAL at 09:02

## 2024-05-21 RX ADMIN — POTASSIUM CHLORIDE 10 MEQ: 7.46 INJECTION, SOLUTION INTRAVENOUS at 00:38

## 2024-05-21 RX ADMIN — ATORVASTATIN CALCIUM 40 MG: 40 TABLET, FILM COATED ORAL at 09:01

## 2024-05-21 RX ADMIN — POTASSIUM CHLORIDE 10 MEQ: 7.46 INJECTION, SOLUTION INTRAVENOUS at 05:39

## 2024-05-21 RX ADMIN — MIDODRINE HYDROCHLORIDE 10 MG: 5 TABLET ORAL at 18:42

## 2024-05-21 RX ADMIN — POTASSIUM CHLORIDE 10 MEQ: 7.46 INJECTION, SOLUTION INTRAVENOUS at 02:22

## 2024-05-21 RX ADMIN — POTASSIUM CHLORIDE 10 MEQ: 7.46 INJECTION, SOLUTION INTRAVENOUS at 04:03

## 2024-05-21 RX ADMIN — SODIUM CHLORIDE, PRESERVATIVE FREE 10 ML: 5 INJECTION INTRAVENOUS at 20:37

## 2024-05-21 RX ADMIN — MIDODRINE HYDROCHLORIDE 10 MG: 5 TABLET ORAL at 13:06

## 2024-05-21 RX ADMIN — MIDODRINE HYDROCHLORIDE 10 MG: 5 TABLET ORAL at 09:01

## 2024-05-21 RX ADMIN — TAMSULOSIN HYDROCHLORIDE 0.4 MG: 0.4 CAPSULE ORAL at 09:02

## 2024-05-21 RX ADMIN — GABAPENTIN 100 MG: 100 CAPSULE ORAL at 20:37

## 2024-05-21 RX ADMIN — PANTOPRAZOLE SODIUM 40 MG: 40 TABLET, DELAYED RELEASE ORAL at 05:40

## 2024-05-21 RX ADMIN — GABAPENTIN 100 MG: 100 CAPSULE ORAL at 13:06

## 2024-05-21 RX ADMIN — POTASSIUM CHLORIDE 40 MEQ: 1500 TABLET, EXTENDED RELEASE ORAL at 10:52

## 2024-05-21 RX ADMIN — LEVOTHYROXINE SODIUM 75 MCG: 75 TABLET ORAL at 05:40

## 2024-05-21 RX ADMIN — APIXABAN 5 MG: 5 TABLET, FILM COATED ORAL at 09:01

## 2024-05-21 RX ADMIN — FUROSEMIDE 20 MG: 10 INJECTION, SOLUTION INTRAMUSCULAR; INTRAVENOUS at 09:00

## 2024-05-21 RX ADMIN — GABAPENTIN 100 MG: 100 CAPSULE ORAL at 09:01

## 2024-05-21 RX ADMIN — SODIUM CHLORIDE, PRESERVATIVE FREE 10 ML: 5 INJECTION INTRAVENOUS at 09:03

## 2024-05-21 RX ADMIN — POTASSIUM CHLORIDE 20 MEQ: 1500 TABLET, EXTENDED RELEASE ORAL at 09:01

## 2024-05-21 ASSESSMENT — ENCOUNTER SYMPTOMS
EYES NEGATIVE: 1
ABDOMINAL DISTENTION: 1
ALLERGIC/IMMUNOLOGIC NEGATIVE: 1
SHORTNESS OF BREATH: 1

## 2024-05-21 NOTE — PROGRESS NOTES
Occupational Therapy  Facility/Department: [address]  Occupational Therapy Initial Assessment    Name: Eliceo Madden  : 1945  MRN: [id number]  Date of Service: 2024    Chief Complaint   Patient presents with    Bloated     Started furosemide  on Thursday- progressive water retention/SOB- denies CP. Pt reports abd hernia-into groin. Seeing surgeon      Discharge Recommendations:  Defer OT at this time  OT Equipment Recommendations  Equipment Needed: No (Pt declines need)     Patient Diagnosis(es): The encounter diagnosis was Acute systolic congestive heart failure (HCC).  Past Medical History:  has a past medical history of Bowel obstruction (HCC), BPH (benign prostatic hyperplasia), Diffuse large B cell lymphoma (HCC), and Hyperlipidemia.  Past Surgical History:  has a past surgical history that includes Cholecystectomy; Vasectomy; Pancreas Biopsy; and Colonoscopy (2017).    Assessment   Assessment: Pt seen for OT eval s/p admission with BLE edema. Pt seen for brief eval, reports PLOF to include living with spouse in single story apartment, IND ADLs and IADLs including driving with occasional use of SPC in the community. Pt is currently MOD I bed mobility, MOD I LB dressing, IND sit<>stand and functional mobility with no device. Pt and spouse educated on safe ADL/ADL transfer techniques for home with both reporting understanding. Pt was seen for an OT eval only and is to be discharge from OT services. Pt appears to be safe to return to PLOF at this time.    Prognosis: Good  Decision Making: Low Complexity    REQUIRES OT FOLLOW-UP: No  Activity Tolerance  Activity Tolerance: Patient Tolerated treatment well        Restrictions  Restrictions/Precautions  Restrictions/Precautions: Up as Tolerated  Required Braces or Orthoses?: No    Subjective   General  Patient assessed for rehabilitation services?: Yes  Family / Caregiver Present: Yes (Spouse)  Subjective  Subjective: RN ok'd pt for OT eval this AM.

## 2024-05-21 NOTE — PLAN OF CARE
Problem: Discharge Planning  Goal: Discharge to home or other facility with appropriate resources  5/21/2024 0102 by Chrissy Cox, RN  Outcome: Progressing  Flowsheets (Taken 5/20/2024 2000)  Discharge to home or other facility with appropriate resources: Identify barriers to discharge with patient and caregiver     Problem: Safety - Adult  Goal: Free from fall injury  5/21/2024 0102 by Chrissy Cox, RN  Outcome: Progressing   NO FALLS OR INJURIES THIS SHIFT, BED IN LOWEST POSITION, BRAKES ON, BED ALARM ON, CALL LIGHT IN REACH, SIDE RAILS UP X2.  Problem: Skin/Tissue Integrity  Goal: Absence of new skin breakdown  Description: 1.  Monitor for areas of redness and/or skin breakdown  2.  Assess vascular access sites hourly  3.  Every 4-6 hours minimum:  Change oxygen saturation probe site  4.  Every 4-6 hours:  If on nasal continuous positive airway pressure, respiratory therapy assess nares and determine need for appliance change or resting period.  5/21/2024 0102 by Chrissy Cox, RN  Outcome: Progressing

## 2024-05-21 NOTE — PROGRESS NOTES
SPIRITUAL CARE DEPARTMENT - [facility]  PROGRESS NOTE    Room # 326/326-01   Name: Eliceo Madden            Gnosticist: Sikh     Reason for visit: Routine    I visited the  patient and family .    Admit Date & Time: 5/20/2024  1:05 PM    Assessment:  Eliceo Madden is a 79 y.o. male in the hospital because \"edema\". Upon entering the room pt was lying in bed. Pt's spouse was seated bedside. Pt and spouse shared about their family. Pt and spouse also shared about their maria elena tradition and Mormon. Pt and spouse appear draw strength from their maria elena in God and prayer. Pt and spouse also said they have \"good support\" from their maria elena \"community\"       Intervention:  I introduced myself and my title as  I offered space for patient  to express feelings, needs, and concerns and provided a ministry presence. Writer actively listened to pt and spouse and offered words of affirmation. Writer also offered a prayer for pt.     Outcome:  Pt and spouse thanked writer for visit. Pt and spouse both appeared to be calm and coping.     Plan:  Chaplains will remain available to offer spiritual and emotional support as needed.    Electronically signed by Chaplain Daquan, on 5/21/2024 at 11:29 AM.  Spiritual Care Department  [facility] -        05/21/24 1126   Encounter Summary   Encounter Overview/Reason Spiritual/Emotional Needs   Service Provided For Patient and family together   Referral/Consult From [facility]   Support System Spouse;Zoroastrian/maria elena community   Last Encounter  05/21/24   Complexity of Encounter Moderate   Begin Time 1045   End Time  1125   Total Time Calculated 40 min   Spiritual/Emotional needs   Type Spiritual Support   Assessment/Intervention/Outcome   Assessment Calm;Coping;Hopeful   Intervention Active listening;Sustaining Presence/Ministry of presence;Prayer (assurance of)/S[place]ent;Discussed belief system/Anglican practices/maria elena;Discussed relationship with God;Nurtured  Hope;Life review/Legacy   Outcome Engaged in conversation;Expressed feelings, needs, and concerns;Expressed Gratitude;Coping;Encouraged;Receptive   Plan and Referrals   Plan/Referrals Continue Support (comment)

## 2024-05-21 NOTE — CARE COORDINATION
Case Management Assessment  Initial Evaluation    Date/Time of Evaluation: 5/21/2024 10:32 AM  Assessment Completed by: Charlotte Ruelas RN    If patient is discharged prior to next notation, then this note serves as note for discharge by case management.    Patient Name: Eliceo Madden                   YOB: 1945  Diagnosis: Acute systolic congestive heart failure (HCC) [I50.21]  Acute on chronic clinical systolic heart failure (HCC) [I50.23]                   Date / Time: 5/20/2024  1:05 PM    Patient Admission Status: Inpatient   Readmission Risk (Low < 19, Mod (19-27), High > 27): Readmission Risk Score: 14.8    Current PCP: Agata Wilde, DO  PCP verified by CM? Yes    Chart Reviewed: Yes      History Provided by: Patient  Patient Orientation: Alert and Oriented    Patient Cognition: Alert    Hospitalization in the last 30 days (Readmission):  No    If yes, Readmission Assessment in CM Navigator will be completed.    Advance Directives:      Code Status: Full Code   Patient's Primary Decision Maker is: Named in Scanned ACP Document    Primary Decision Maker: Mima Madden - Unknown - [phone]    Discharge Planning:    Patient lives with: Spouse/Significant Other Type of Home: House  Primary Care Giver: Self  Patient Support Systems include: Spouse/Significant Other, Children, Family Members, [place] Community, Friends/Neighbors   Current Financial resources: Medicare  Current community resources: None  Current services prior to admission: Durable Medical Equipment            Current DME: Walker, Cane, Shower Chair            Type of Home Care services:  None    ADLS  Prior functional level: Independent in ADLs/IADLs  Current functional level: Independent in ADLs/IADLs    PT AM-PAC:   /24  OT AM-PAC: 24 /24    Family can provide assistance at DC: Yes  Would you like Case Management to discuss the discharge plan with any other family members/significant others, and if so, who? Yes

## 2024-05-21 NOTE — ACP (ADVANCE CARE PLANNING)
Advance Care Planning     Advance Care Planning Activator (Inpatient)  Conversation Note      Date of ACP Conversation: 5/21/2024     Conversation Conducted with: Patient with Decision Making Capacity    ACP Activator: Charlotte Ruelas RN    {When Decision Maker makes decisions on behalf of the incapacitated patient: Decision Maker is asked to consider and make decisions based on patient values, known preferences, or best interests.     Health Care Decision Maker:     Current Designated Health Care Decision Maker:     Primary Decision Maker: Mima Madden - [phone]  Click here to complete Healthcare Decision Makers including section of the Healthcare Decision Maker Relationship (ie \"Primary\")      Care Preferences    Ventilation:  \"If you were in your present state of health and suddenly became very ill and were unable to breathe on your own, what would your preference be about the use of a ventilator (breathing machine) if it were available to you?\"      Would the patient desire the use of ventilator (breathing machine)?: yes    \"If your health worsens and it becomes clear that your chance of recovery is unlikely, what would your preference be about the use of a ventilator (breathing machine) if it were available to you?\"     Would the patient desire the use of ventilator (breathing machine)?: No      Resuscitation  \"CPR works best to restart the heart when there is a sudden event, like a heart attack, in someone who is otherwise healthy. Unfortunately, CPR does not typically restart the heart for people who have serious health conditions or who are very sick.\"    \"In the event your heart stopped as a result of an underlying serious health condition, would you want attempts to be made to restart your heart (answer \"yes\" for attempt to resuscitate) or would you prefer a natural death (answer \"no\" for do not attempt to resuscitate)?\" yes       [] Yes   [x] No   Educated Patient / Decision Maker  regarding differences between Advance Directives and portable DNR orders.    Length of ACP Conversation in minutes:  5    Conversation Outcomes:  ACP discussion completed    Follow-up plan:    [] Schedule follow-up conversation to continue planning  [] Referred individual to Provider for additional questions/concerns   [] Advised patient/agent/surrogate to review completed ACP document and update if needed with changes in condition, patient preferences or care setting    [] This note routed to one or more involved healthcare providers

## 2024-05-21 NOTE — PLAN OF CARE
Problem: Discharge Planning  Goal: Discharge to home or other facility with appropriate resources  Outcome: Progressing  Flowsheets (Taken 5/21/2024 0900)  Discharge to home or other facility with appropriate resources:   Identify barriers to discharge with patient and caregiver   Arrange for needed discharge resources and transportation as appropriate   Identify discharge learning needs (meds, wound care, etc)   Refer to discharge planning if patient needs post-hospital services based on physician order or complex needs related to functional status, cognitive ability or social support system     Problem: Safety - Adult  Goal: Free from fall injury  Outcome: Progressing  Flowsheets (Taken 5/21/2024 0905)  Free From Fall Injury: Instruct family/caregiver on patient safety   - safety maintained.     Problem: Skin/Tissue Integrity  Goal: Absence of new skin breakdown  Description: 1.  Monitor for areas of redness and/or skin breakdown  2.  Assess vascular access sites hourly  3.  Every 4-6 hours minimum:  Change oxygen saturation probe site  4.  Every 4-6 hours:  If on nasal continuous positive airway pressure, respiratory therapy assess nares and determine need for appliance change or resting period.  Outcome: Progressing   - Skin monitored. No new breakdown noted.     Problem: Chronic Conditions and Co-morbidities  Goal: Patient's chronic conditions and co-morbidity symptoms are monitored and maintained or improved  Outcome: Progressing  Flowsheets (Taken 5/21/2024 0900)  Care Plan - Patient's Chronic Conditions and Co-Morbidity Symptoms are Monitored and Maintained or Improved: Monitor and assess patient's chronic conditions and comorbid symptoms for stability, deterioration, or improvement

## 2024-05-21 NOTE — PROGRESS NOTES
[facility]  Office: [phone]  Jonah Boston DO, Vahe Dupont DO, Naveed Callejas DO, Hammad Jenkins DO, Federico Díaz MD, Lauren Benjamin MD, Georges Le MD, Babita Mccann MD,  Gurdeep Barcenas MD, Sabas Rai MD, Jack Duncan DO, Altagracia Santillan MD,  Patrick Barnett DO, Jerrell Dawson MD, Farzad Nelson MD, Sung Boston DO, Kesha Luna MD,  Nick Lopez DO, Tanesha Ruano MD, Glenis Ceballos MD, Natty Begum MD, Era Olivares MD,  Jakob Seaman MD, Dharmesh Hill MD, Naomy Esparza MD, Jason Pollack DO, Shanna Talley MD,  David Philippe MD, Shirley Waterhouse, CNP,  Kristin Siddiqui, CNP, Chrissy Sweet, CNP, Elian Zhao, CNP,  Chio Ortega, DNP, Bethany Mendez, CNP, Ann Oro, CNP, Sabrina Sin, CNP, Aniya Tran, CNP, Nandini Pena, CNP, Rc Yoder PA-C, Precious Galindo, CNS, July Sarabia, CNP, Charlotte Thomson, CNP         [facility]   IN-PATIENT SERVICE   [facility]    Progress Note    5/21/2024    5:02 PM    Name:   Eliceo Madden  MRN:     [id number]     Acct:      [id number]   Room:   326/326-01   Day:  1  Admit Date:  5/20/2024  1:05 PM    PCP:   Agata Wilde DO  Code Status:  Full Code    Subjective:     Patient seen and examined this morning, resting in his bed.  Lower extremity swelling improved.  Does have abdominal distention.  He has inguinal hernia which is increased with cough.  Patient stated that he will follow-up outpatient with his primary surgeon for scheduled procedure.  Remained afebrile overnight, vitals within normal range.  Lab work reviewed, potassium 3.5 with repeat 2.6, replaced.  Creatinine 1.2.  No leukocytosis, hemoglobin dropped to 7.4.  Platelets 188.  Hepatitis panel negative.  Ultrasound abdomen suggestive of moderate ascites.    Medications:     Allergies:    Allergies   Allergen Reactions    No Known Allergies        Current Meds:   Scheduled Meds:    atorvastatin  40 mg Oral Daily     Bilateral lower extremity edema 5/20/2024 Yes    Cirrhosis of liver (HCC) 5/20/2024 Yes    Long term (current) use of anticoagulants 5/20/2024 Yes    Status post placement of implantable loop recorder 5/20/2024 Yes    Anasarca 5/20/2024 Yes    Hypokalemia 5/20/2024 Yes    Chronic hypotension 5/20/2024 Yes    Atrial fibrillation (HCC) 5/20/2024 Yes    Hypothyroidism 5/20/2024 Yes        Plan:     Continue IV diuretics, strict I's and O's, daily weight.  Ultrasound abdomen concerning for moderate ascites, GI consulted.  Order placed for IR guided paracentesis.  Follow-up on fluid analysis and GI recommendations.  Continue to monitor electrolytes, replace as indicated.  Continue home dose Eliquis and amiodarone.  Will hold the Eliquis prior to IR procedure.  Continue midodrine.  Continue home dose Synthroid and Flomax.  Patient has a history of inguinal hernia and has an upcoming appointment with general surgery next month.    Medical Decision Making: Hudson Taylor MD  5/21/2024  5:02 PM

## 2024-05-21 NOTE — PROGRESS NOTES
Physical Therapy                                                                     Physical Therapy Cancel Note      DATE: 2024    NAME: Eliceo Madden  MRN: [id number]   : 1945      Patient not seen this date for Physical Therapy due to: Therapy visit pt room. Pt report independent with mobility in room and just up to bathroom and combed his hair. Pt declines need for acute PT and repeat decline indicating no concerns for LE weakness, new balance or stair concerns. States 2 step entry with rail on no step front entry to his [place]. He uses cane prn outdoors for prior balance issues. Pt reports trunk and LE edema currently but abdominal edema decreased since admit but still getting diuresed. Pt demonstrate modified indep bed mobility and independent transfers and gait without device in room. RN confirms pt independence.     Patient independent with functional mobility. Will defer PT evaluation at this time. Please reorder PT if future needs arise.       Electronically signed by Keri Sahu PT on 2024 at 10:18 AM

## 2024-05-22 ENCOUNTER — APPOINTMENT (OUTPATIENT)
Age: 79
DRG: 292 | End: 2024-05-22
Payer: MEDICARE

## 2024-05-22 PROBLEM — D64.9 ANEMIA: Status: ACTIVE | Noted: 2024-05-22

## 2024-05-22 PROBLEM — I50.21 ACUTE SYSTOLIC CONGESTIVE HEART FAILURE (HCC): Status: ACTIVE | Noted: 2024-05-22

## 2024-05-22 LAB
A1AT SERPL-MCNC: 247 MG/DL (ref 90–200)
AFP SERPL-MCNC: 2.4 UG/L
ANION GAP SERPL CALCULATED.3IONS-SCNC: 7 MMOL/L (ref 9–17)
BASOPHILS # BLD: 0 K/UL (ref 0–0.2)
BASOPHILS NFR BLD: 0 % (ref 0–2)
BUN SERPL-MCNC: 14 MG/DL (ref 8–23)
CALCIUM SERPL-MCNC: 7.8 MG/DL (ref 8.6–10.4)
CERULOPLASMIN SERPL-MCNC: 26 MG/DL (ref 15–30)
CHLORIDE SERPL-SCNC: 103 MMOL/L (ref 98–107)
CO2 SERPL-SCNC: 30 MMOL/L (ref 20–31)
CREAT SERPL-MCNC: 1.2 MG/DL (ref 0.7–1.2)
ECHO AO ASC DIAM: 3.6 CM
ECHO AO ASCENDING AORTA INDEX: 1.6 CM/M2
ECHO AO ROOT DIAM: 3.3 CM
ECHO AO ROOT INDEX: 1.47 CM/M2
ECHO BSA: 2.3 M2
ECHO EST RA PRESSURE: 3 MMHG
ECHO IVC EXP: 2.1 CM
ECHO LA AREA 2C: 29.8 CM2
ECHO LA AREA 4C: 35.9 CM2
ECHO LA DIAMETER INDEX: 1.87 CM/M2
ECHO LA DIAMETER: 4.2 CM
ECHO LA MAJOR AXIS: 8 CM
ECHO LA MINOR AXIS: 7 CM
ECHO LA TO AORTIC ROOT RATIO: 1.27
ECHO LA VOL BP: 119 ML (ref 18–58)
ECHO LA VOL MOD A2C: 102 ML (ref 18–58)
ECHO LA VOL MOD A4C: 122 ML (ref 18–58)
ECHO LA VOL/BSA BIPLANE: 53 ML/M2 (ref 16–34)
ECHO LA VOLUME INDEX MOD A2C: 45 ML/M2 (ref 16–34)
ECHO LA VOLUME INDEX MOD A4C: 54 ML/M2 (ref 16–34)
ECHO LV EDV A2C: 88 ML
ECHO LV EDV A4C: 160 ML
ECHO LV EDV INDEX A4C: 71 ML/M2
ECHO LV EDV NDEX A2C: 39 ML/M2
ECHO LV EJECTION FRACTION A2C: 43 %
ECHO LV EJECTION FRACTION A4C: 39 %
ECHO LV EJECTION FRACTION BIPLANE: 41 % (ref 55–100)
ECHO LV ESV A2C: 51 ML
ECHO LV ESV A4C: 98 ML
ECHO LV ESV INDEX A2C: 23 ML/M2
ECHO LV ESV INDEX A4C: 44 ML/M2
ECHO LV FRACTIONAL SHORTENING: 23 % (ref 28–44)
ECHO LV INTERNAL DIMENSION DIASTOLE INDEX: 1.96 CM/M2
ECHO LV INTERNAL DIMENSION DIASTOLIC: 4.4 CM (ref 4.2–5.9)
ECHO LV INTERNAL DIMENSION SYSTOLIC INDEX: 1.51 CM/M2
ECHO LV INTERNAL DIMENSION SYSTOLIC: 3.4 CM
ECHO LV IVSD: 0.9 CM (ref 0.6–1)
ECHO LV MASS 2D: 147.8 G (ref 88–224)
ECHO LV MASS INDEX 2D: 65.7 G/M2 (ref 49–115)
ECHO LV POSTERIOR WALL DIASTOLIC: 1.1 CM (ref 0.6–1)
ECHO LV RELATIVE WALL THICKNESS RATIO: 0.5
ECHO RIGHT VENTRICULAR SYSTOLIC PRESSURE (RVSP): 29 MMHG
ECHO RV TAPSE: 2.2 CM (ref 1.7–?)
ECHO TV REGURGITANT MAX VELOCITY: 2.55 M/S
ECHO TV REGURGITANT PEAK GRADIENT: 26 MMHG
EOSINOPHIL # BLD: 0.16 K/UL (ref 0–0.4)
EOSINOPHILS RELATIVE PERCENT: 4 % (ref 1–4)
ERYTHROCYTE [DISTWIDTH] IN BLOOD BY AUTOMATED COUNT: 15.9 % (ref 12.5–15.4)
FERRITIN SERPL-MCNC: 131 NG/ML (ref 30–400)
GFR, ESTIMATED: 61 ML/MIN/1.73M2
GLUCOSE SERPL-MCNC: 90 MG/DL (ref 70–99)
HAV IGM SERPL QL IA: NONREACTIVE
HBV CORE IGM SERPL QL IA: NONREACTIVE
HBV SURFACE AG SERPL QL IA: NONREACTIVE
HCT VFR BLD AUTO: 25.1 % (ref 41–53)
HCV AB SERPL QL IA: NONREACTIVE
HGB BLD-MCNC: 8.7 G/DL (ref 13.5–17.5)
INR PPP: 1.2
IRON SATN MFR SERPL: 13 % (ref 20–55)
IRON SERPL-MCNC: 28 UG/DL (ref 61–157)
LYMPHOCYTES NFR BLD: 0.2 K/UL (ref 1–4.8)
LYMPHOCYTES RELATIVE PERCENT: 5 % (ref 24–44)
MCH RBC QN AUTO: 31.1 PG (ref 26–34)
MCHC RBC AUTO-ENTMCNC: 34.8 G/DL (ref 31–37)
MCV RBC AUTO: 89.5 FL (ref 80–100)
MONOCYTES NFR BLD: 0.44 K/UL (ref 0.1–0.8)
MONOCYTES NFR BLD: 11 % (ref 1–7)
MORPHOLOGY: ABNORMAL
NEUTROPHILS NFR BLD: 80 % (ref 36–66)
NEUTS SEG NFR BLD: 3.2 K/UL (ref 1.8–7.7)
PARTIAL THROMBOPLASTIN TIME: 27.1 SEC (ref 21.3–31.3)
PARTIAL THROMBOPLASTIN TIME: 37.5 SEC (ref 21.3–31.3)
PLATELET # BLD AUTO: 209 K/UL (ref 140–450)
PMV BLD AUTO: 7.5 FL (ref 6–12)
POTASSIUM SERPL-SCNC: 4.3 MMOL/L (ref 3.7–5.3)
PROTHROMBIN TIME: 12.4 SEC (ref 9.4–12.6)
RBC # BLD AUTO: 2.81 M/UL (ref 4.5–5.9)
SODIUM SERPL-SCNC: 140 MMOL/L (ref 135–144)
TIBC SERPL-MCNC: 211 UG/DL (ref 250–450)
UNSATURATED IRON BINDING CAPACITY: 183 UG/DL (ref 112–347)
WBC OTHER # BLD: 4 K/UL (ref 3.5–11)

## 2024-05-22 PROCEDURE — 36415 COLL VENOUS BLD VENIPUNCTURE: CPT

## 2024-05-22 PROCEDURE — 93308 TTE F-UP OR LMTD: CPT | Performed by: INTERNAL MEDICINE

## 2024-05-22 PROCEDURE — 85025 COMPLETE CBC W/AUTO DIFF WBC: CPT

## 2024-05-22 PROCEDURE — 80048 BASIC METABOLIC PNL TOTAL CA: CPT

## 2024-05-22 PROCEDURE — 83540 ASSAY OF IRON: CPT

## 2024-05-22 PROCEDURE — APPNB30 APP NON BILLABLE TIME 0-30 MINS: Performed by: NURSE PRACTITIONER

## 2024-05-22 PROCEDURE — 83516 IMMUNOASSAY NONANTIBODY: CPT

## 2024-05-22 PROCEDURE — 86225 DNA ANTIBODY NATIVE: CPT

## 2024-05-22 PROCEDURE — 6360000002 HC RX W HCPCS: Performed by: STUDENT IN AN ORGANIZED HEALTH CARE EDUCATION/TRAINING PROGRAM

## 2024-05-22 PROCEDURE — 6360000004 HC RX CONTRAST MEDICATION: Performed by: STUDENT IN AN ORGANIZED HEALTH CARE EDUCATION/TRAINING PROGRAM

## 2024-05-22 PROCEDURE — 6370000000 HC RX 637 (ALT 250 FOR IP): Performed by: STUDENT IN AN ORGANIZED HEALTH CARE EDUCATION/TRAINING PROGRAM

## 2024-05-22 PROCEDURE — 82105 ALPHA-FETOPROTEIN SERUM: CPT

## 2024-05-22 PROCEDURE — 86376 MICROSOMAL ANTIBODY EACH: CPT

## 2024-05-22 PROCEDURE — 1210000000 HC MED SURG R&B

## 2024-05-22 PROCEDURE — 85610 PROTHROMBIN TIME: CPT

## 2024-05-22 PROCEDURE — 80074 ACUTE HEPATITIS PANEL: CPT

## 2024-05-22 PROCEDURE — 99223 1ST HOSP IP/OBS HIGH 75: CPT | Performed by: INTERNAL MEDICINE

## 2024-05-22 PROCEDURE — C8924 2D TTE W OR W/O FOL W/CON,FU: HCPCS

## 2024-05-22 PROCEDURE — 82390 ASSAY OF CERULOPLASMIN: CPT

## 2024-05-22 PROCEDURE — 6370000000 HC RX 637 (ALT 250 FOR IP): Performed by: NURSE PRACTITIONER

## 2024-05-22 PROCEDURE — 85730 THROMBOPLASTIN TIME PARTIAL: CPT

## 2024-05-22 PROCEDURE — 99232 SBSQ HOSP IP/OBS MODERATE 35: CPT | Performed by: STUDENT IN AN ORGANIZED HEALTH CARE EDUCATION/TRAINING PROGRAM

## 2024-05-22 PROCEDURE — 86038 ANTINUCLEAR ANTIBODIES: CPT

## 2024-05-22 PROCEDURE — 82103 ALPHA-1-ANTITRYPSIN TOTAL: CPT

## 2024-05-22 PROCEDURE — 2580000003 HC RX 258: Performed by: STUDENT IN AN ORGANIZED HEALTH CARE EDUCATION/TRAINING PROGRAM

## 2024-05-22 PROCEDURE — 83550 IRON BINDING TEST: CPT

## 2024-05-22 PROCEDURE — 82728 ASSAY OF FERRITIN: CPT

## 2024-05-22 RX ORDER — SPIRONOLACTONE 25 MG/1
50 TABLET ORAL DAILY
Status: DISCONTINUED | OUTPATIENT
Start: 2024-05-22 | End: 2024-05-24 | Stop reason: HOSPADM

## 2024-05-22 RX ORDER — HEPARIN SODIUM 10000 [USP'U]/100ML
5-30 INJECTION, SOLUTION INTRAVENOUS CONTINUOUS
Status: DISCONTINUED | OUTPATIENT
Start: 2024-05-22 | End: 2024-05-23

## 2024-05-22 RX ORDER — HEPARIN SODIUM 1000 [USP'U]/ML
4000 INJECTION, SOLUTION INTRAVENOUS; SUBCUTANEOUS PRN
Status: DISCONTINUED | OUTPATIENT
Start: 2024-05-22 | End: 2024-05-24

## 2024-05-22 RX ORDER — HEPARIN SODIUM 1000 [USP'U]/ML
2000 INJECTION, SOLUTION INTRAVENOUS; SUBCUTANEOUS PRN
Status: DISCONTINUED | OUTPATIENT
Start: 2024-05-22 | End: 2024-05-24

## 2024-05-22 RX ADMIN — AMIODARONE HYDROCHLORIDE 100 MG: 200 TABLET ORAL at 09:19

## 2024-05-22 RX ADMIN — HEPARIN SODIUM 9 UNITS/KG/HR: 10000 INJECTION, SOLUTION INTRAVENOUS at 12:43

## 2024-05-22 RX ADMIN — GABAPENTIN 100 MG: 100 CAPSULE ORAL at 14:37

## 2024-05-22 RX ADMIN — PERFLUTREN 1.5 ML: 6.52 INJECTION, SUSPENSION INTRAVENOUS at 11:29

## 2024-05-22 RX ADMIN — MIDODRINE HYDROCHLORIDE 10 MG: 5 TABLET ORAL at 12:41

## 2024-05-22 RX ADMIN — POTASSIUM CHLORIDE 20 MEQ: 1500 TABLET, EXTENDED RELEASE ORAL at 09:20

## 2024-05-22 RX ADMIN — ATORVASTATIN CALCIUM 40 MG: 40 TABLET, FILM COATED ORAL at 09:20

## 2024-05-22 RX ADMIN — PANTOPRAZOLE SODIUM 40 MG: 40 TABLET, DELAYED RELEASE ORAL at 09:20

## 2024-05-22 RX ADMIN — GABAPENTIN 100 MG: 100 CAPSULE ORAL at 20:46

## 2024-05-22 RX ADMIN — GABAPENTIN 100 MG: 100 CAPSULE ORAL at 09:20

## 2024-05-22 RX ADMIN — SODIUM CHLORIDE, PRESERVATIVE FREE 10 ML: 5 INJECTION INTRAVENOUS at 09:21

## 2024-05-22 RX ADMIN — SODIUM CHLORIDE, PRESERVATIVE FREE 10 ML: 5 INJECTION INTRAVENOUS at 20:46

## 2024-05-22 RX ADMIN — FUROSEMIDE 20 MG: 10 INJECTION, SOLUTION INTRAMUSCULAR; INTRAVENOUS at 09:19

## 2024-05-22 RX ADMIN — LEVOTHYROXINE SODIUM 75 MCG: 75 TABLET ORAL at 09:20

## 2024-05-22 RX ADMIN — SPIRONOLACTONE 50 MG: 25 TABLET, FILM COATED ORAL at 09:20

## 2024-05-22 RX ADMIN — TAMSULOSIN HYDROCHLORIDE 0.4 MG: 0.4 CAPSULE ORAL at 09:20

## 2024-05-22 RX ADMIN — HEPARIN SODIUM 2000 UNITS: 1000 INJECTION INTRAVENOUS; SUBCUTANEOUS at 19:05

## 2024-05-22 NOTE — PROGRESS NOTES
spironolactone  50 mg Oral Daily    atorvastatin  40 mg Oral Daily    pantoprazole  40 mg Oral QAM AC    tamsulosin  0.4 mg Oral Daily    sodium chloride flush  5-40 mL IntraVENous 2 times per day    amiodarone  100 mg Oral Daily    [Held by provider] apixaban  5 mg Oral BID    gabapentin  100 mg Oral TID    midodrine  10 mg Oral TID    potassium chloride  20 mEq Oral Daily with breakfast    levothyroxine  75 mcg Oral Daily    furosemide  20 mg IntraVENous Daily     Continuous Infusions:    heparin (PORCINE) Infusion 9 Units/kg/hr (24 1243)    sodium chloride       PRN Meds: heparin (porcine), heparin (porcine), sodium chloride flush, sodium chloride, potassium chloride **OR** potassium alternative oral replacement **OR** potassium chloride, magnesium sulfate, ondansetron **OR** ondansetron, polyethylene glycol, acetaminophen **OR** acetaminophen    Data:     Vitals:  /67   Pulse 71   Temp 98.6 °F (37 °C) (Oral)   Resp 16   Ht 1.8 m (5' 10.87\")   Wt 106 kg (233 lb 11 oz)   SpO2 98%   BMI 32.72 kg/m²   Temp (24hrs), Av.2 °F (36.8 °C), Min:97.7 °F (36.5 °C), Max:98.6 °F (37 °C)    No results for input(s): \"POCGLU\" in the last 72 hours.    I/O (24Hr):    Intake/Output Summary (Last 24 hours) at 2024 1755  Last data filed at 2024 1436  Gross per 24 hour   Intake 720 ml   Output 3605 ml   Net -2885 ml         Labs:  Hematology:  Recent Labs     24  1327 24  0449 24  0707 24  1136   WBC 4.4 3.5 4.0  --    RBC 2.95* 2.45* 2.81*  --    HGB 8.9* 7.4* 8.7*  --    HCT 26.7* 22.2* 25.1*  --    MCV 90.5 90.6 89.5  --    MCH 30.1 30.3 31.1  --    MCHC 33.3 33.4 34.8  --    RDW 16.1* 15.9* 15.9*  --     188 209  --    MPV 7.4 7.2 7.5  --    INR  --   --   --  1.2       Chemistry:  Recent Labs     24  1327 24  1508 24  1947 24  0449 24  0932 24  0707   *  --   --  142  --  140   K 2.8*  --    < > 3.5* 3.6* 4.3     --    Status:  oriented to person, place and time and normal affect  Lungs:  clear to auscultation bilaterally, normal effort  Heart:  regular rate and rhythm, no murmur  Abdomen:  soft, nontender, distended, normal bowel sounds, palpable inguinal hernia.  Extremities: Improved bilateral lower extremity edema, no redness, tenderness in the calves  Skin:  no gross lesions, rashes, induration    Assessment:     Hospital Problems             Last Modified POA    * (Principal) Bilateral lower extremity edema 5/20/2024 Yes    Cirrhosis of liver (HCC) 5/20/2024 Yes    Long term (current) use of anticoagulants 5/20/2024 Yes    Status post placement of implantable loop recorder 5/20/2024 Yes    Anasarca 5/20/2024 Yes    Hypokalemia 5/20/2024 Yes    Chronic hypotension 5/20/2024 Yes    Atrial fibrillation (HCC) 5/20/2024 Yes    Hypothyroidism 5/20/2024 Yes    Anemia 5/22/2024 Yes    Acute systolic congestive heart failure (HCC) 5/22/2024 Yes      Plan:     Continue IV diuretics, strict I's and O's, daily weight.  Ultrasound abdomen concerning for moderate ascites, GI on board.  Order placed for IR guided paracentesis.  Follow-up on fluid analysis and GI recommendations.  Continue to monitor electrolytes, replace as indicated.  Home dose Eliquis and amiodarone were resumed.  Currently Eliquis is on hold, patient started on heparin gtt.  Will hold prior to procedure.  Continue midodrine.  Continue home dose Synthroid and Flomax.  Patient has a history of inguinal hernia and has an upcoming appointment with general surgery next month.  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  Patient need to follow-up outpatient with his primary oncologist.  Anticipate discharge in next 24 to 48 hours, if okay with GI.    Medical Decision Making: Hduson MCKEON

## 2024-05-22 NOTE — PLAN OF CARE
Problem: Discharge Planning  Goal: Discharge to home or other facility with appropriate resources  5/22/2024 0014 by Chrissy Cox, RN  Outcome: Progressing  Flowsheets (Taken 5/21/2024 2000)  Discharge to home or other facility with appropriate resources: Identify barriers to discharge with patient and caregiver     Problem: Safety - Adult  Goal: Free from fall injury  5/22/2024 0014 by Chrissy Cox, RN  Outcome: Progressing   NO FALLS OR INJURIES THIS SHIFT, BED IN LOWEST POSITION, BRAKES ON, BED ALARM ON, CALL LIGHT IN REACH, SIDE RAILS UP X2.  Problem: Skin/Tissue Integrity  Goal: Absence of new skin breakdown  Description: 1.  Monitor for areas of redness and/or skin breakdown  2.  Assess vascular access sites hourly  3.  Every 4-6 hours minimum:  Change oxygen saturation probe site  4.  Every 4-6 hours:  If on nasal continuous positive airway pressure, respiratory therapy assess nares and determine need for appliance change or resting period.  5/22/2024 0014 by Chrissy Cox RN  Outcome: Progressing     Problem: Chronic Conditions and Co-morbidities  Goal: Patient's chronic conditions and co-morbidity symptoms are monitored and maintained or improved  5/22/2024 0014 by Chrissy Cox RN  Outcome: Progressing  Flowsheets (Taken 5/21/2024 2000)  Care Plan - Patient's Chronic Conditions and Co-Morbidity Symptoms are Monitored and Maintained or Improved: Monitor and assess patient's chronic conditions and comorbid symptoms for stability, deterioration, or improvement

## 2024-05-22 NOTE — PLAN OF CARE
Problem: Discharge Planning  Goal: Discharge to home or other facility with appropriate resources  Outcome: Progressing  Flowsheets (Taken 5/22/2024 0845)  Discharge to home or other facility with appropriate resources: Identify barriers to discharge with patient and caregiver     Problem: Safety - Adult  Goal: Free from fall injury  Outcome: Progressing     Problem: Skin/Tissue Integrity  Goal: Absence of new skin breakdown  Description: 1.  Monitor for areas of redness and/or skin breakdown  2.  Assess vascular access sites hourly  3.  Every 4-6 hours minimum:  Change oxygen saturation probe site  4.  Every 4-6 hours:  If on nasal continuous positive airway pressure, respiratory therapy assess nares and determine need for appliance change or resting period.  Outcome: Progressing     Problem: Chronic Conditions and Co-morbidities  Goal: Patient's chronic conditions and co-morbidity symptoms are monitored and maintained or improved  Outcome: Progressing  Flowsheets (Taken 5/22/2024 0845)  Care Plan - Patient's Chronic Conditions and Co-Morbidity Symptoms are Monitored and Maintained or Improved: Monitor and assess patient's chronic conditions and comorbid symptoms for stability, deterioration, or improvement

## 2024-05-22 NOTE — CONSULTS
DAILY PO) Take 1 capsule by mouth  Patient not taking: Reported on 4/23/2024    Eliza Gallegos MD   gabapentin (NEURONTIN) 100 MG capsule Take 1 capsule by mouth 3 times daily.    Eliza Gallegos MD   levothyroxine (SYNTHROID) 112 MCG tablet Take 75 mcg by mouth Daily  Patient not taking: Reported on 5/20/2024    Eliza Gallegos MD   fluticasone (FLONASE) 50 MCG/ACT nasal spray 1 spray by Each Nostril route daily  Patient not taking: Reported on 4/23/2024    Eliza Gallegos MD   Coenzyme Q10 (COQ-10) 100 MG CAPS Take 1 capsule by mouth daily    Eliza Gallegos MD   omeprazole 20 MG EC tablet Take 1 tablet by mouth daily    Eliza Gallegos MD   senna (SENOKOT) 8.6 MG tablet Take 1 tablet by mouth as needed for Constipation    Eliza Gallegos MD   acetaminophen (TYLENOL) 500 MG tablet Take 1 tablet by mouth every 4 hours as needed for Pain  Patient not taking: Reported on 5/20/2024    Eliza Gallegos MD   cetirizine (ZYRTEC) 10 MG tablet Take 1 tablet by mouth daily  Patient not taking: Reported on 4/23/2024    Eliza Gallegos MD   amiodarone (CORDARONE) 200 MG tablet Take 1 tablet by mouth daily  Patient taking differently: Take 0.5 tablets by mouth daily 9/9/20   Maribel Davies MD   ONDANSETRON HCL PO Take 1 mg by mouth every 8 hours as needed for Nausea or Vomiting    Eliza Gallegos MD   Multiple Vitamin (MULTI VITAMIN) TABS Take by mouth    Eliza Gallegos MD   ATORVASTATIN CALCIUM PO Take 40 mg by mouth daily    Eliza Gallegos MD       CURRENT MEDICATIONS:  Scheduled Meds:   atorvastatin  40 mg Oral Daily    pantoprazole  40 mg Oral QAM AC    tamsulosin  0.4 mg Oral Daily    sodium chloride flush  5-40 mL IntraVENous 2 times per day    amiodarone  100 mg Oral Daily    apixaban  5 mg Oral BID    gabapentin  100 mg Oral TID    midodrine  10 mg Oral TID    potassium chloride  20 mEq Oral Daily with breakfast    levothyroxine  75 mcg Oral  No rashes.  No jaundice.  No stigmata of liver disease.    MUSC/SKEL:   Adequate muscle bulk for patient's age, No significant synovitis, No deformities  NEURO:  A&O x Three, CN II- XII grossly intact    LABS AND IMAGING:    CBC  Recent Labs     05/20/24  1327 05/21/24  0449   WBC 4.4 3.5   HGB 8.9* 7.4*   HCT 26.7* 22.2*   MCV 90.5 90.6    188       BMP  Recent Labs     05/20/24  1327 05/20/24  1947 05/21/24  0449 05/21/24  0932   *  --  142  --    K 2.8* 2.9* 3.5* 3.6*     --  106  --    CO2 31  --  30  --    BUN 13  --  13  --    CREATININE 1.2  --  1.2  --    GLUCOSE 108*  --  85  --    CALCIUM 7.3*  --  6.8*  --    MG  --   --  1.6  --        LFTS  Recent Labs     05/20/24  1327   ALKPHOS 165*   ALT 20   AST 30   BILITOT 0.3   Ct abd 5/15/24  FINDINGS:  Lower Chest: Moderate right and small left pleural effusions and lower lobe  atelectatic changes.  Mild pericardial effusion.     Organs: Liver demonstrates cirrhosis and shrinkage.  Status post  cholecystectomy.  Pancreas and spleen demonstrate no acute abnormality.  Some  splenic calcification noted.  Adrenals and right kidney appear stable.  The  left kidney demonstrates multiple parapelvic cysts but no obstructive  uropathy.  No renal stones.     GI/Bowel: No evidence of bowel obstruction or perforation.  Diverticulosis  but no acute diverticulitis.     Pelvis: Urinary bladder and prostate appear stable.  Bilateral inguinal  hernias containing mesentery and fluid, but no bowel involvement.  No pelvic  lymphadenopathy.     Peritoneum/Retroperitoneum: Retroperitoneal lymphadenopathy with nodes  measuring up to 2 cm.  There is a calcified mesenteric mass which is  difficult to measure due to lack of administration of contrast.  It roughly  measures approximately 6.4 x 8.2 cm.  Differential diagnosis includes a  desmoid tumor, sarcoma or mesenteric fibrosis.  There is moderate ascites and  mesenteric edema.  A ventral hernia containing

## 2024-05-23 ENCOUNTER — APPOINTMENT (OUTPATIENT)
Age: 79
DRG: 292 | End: 2024-05-23
Attending: STUDENT IN AN ORGANIZED HEALTH CARE EDUCATION/TRAINING PROGRAM
Payer: MEDICARE

## 2024-05-23 LAB
ALBUMIN FLD-MCNC: 1.1 G/DL
ANION GAP SERPL CALCULATED.3IONS-SCNC: 6 MMOL/L (ref 9–17)
BASOPHILS # BLD: 0 K/UL (ref 0–0.2)
BASOPHILS NFR BLD: 0 % (ref 0–2)
BUN SERPL-MCNC: 14 MG/DL (ref 8–23)
CALCIUM SERPL-MCNC: 7.4 MG/DL (ref 8.6–10.4)
CHLORIDE SERPL-SCNC: 107 MMOL/L (ref 98–107)
CO2 SERPL-SCNC: 27 MMOL/L (ref 20–31)
CREAT SERPL-MCNC: 1.2 MG/DL (ref 0.7–1.2)
EOSINOPHIL # BLD: 0.15 K/UL (ref 0–0.4)
EOSINOPHILS RELATIVE PERCENT: 4 % (ref 1–4)
ERYTHROCYTE [DISTWIDTH] IN BLOOD BY AUTOMATED COUNT: 16.3 % (ref 12.5–15.4)
GFR, ESTIMATED: 61 ML/MIN/1.73M2
GLUCOSE SERPL-MCNC: 91 MG/DL (ref 70–99)
HCT VFR BLD AUTO: 23.1 % (ref 41–53)
HGB BLD-MCNC: 7.6 G/DL (ref 13.5–17.5)
LDH FLD L TO P-CCNC: 84 U/L
LYMPHOCYTES NFR BLD: 0.34 K/UL (ref 1–4.8)
LYMPHOCYTES RELATIVE PERCENT: 9 % (ref 24–44)
MCH RBC QN AUTO: 29.7 PG (ref 26–34)
MCHC RBC AUTO-ENTMCNC: 33 G/DL (ref 31–37)
MCV RBC AUTO: 90.1 FL (ref 80–100)
MONOCYTES NFR BLD: 0.3 K/UL (ref 0.1–0.8)
MONOCYTES NFR BLD: 8 % (ref 1–7)
MORPHOLOGY: ABNORMAL
NEUTROPHILS NFR BLD: 79 % (ref 36–66)
NEUTS SEG NFR BLD: 3.01 K/UL (ref 1.8–7.7)
PARTIAL THROMBOPLASTIN TIME: 27 SEC (ref 21.3–31.3)
PARTIAL THROMBOPLASTIN TIME: 27.2 SEC (ref 21.3–31.3)
PARTIAL THROMBOPLASTIN TIME: 67.5 SEC (ref 21.3–31.3)
PARTIAL THROMBOPLASTIN TIME: 73.2 SEC (ref 21.3–31.3)
PLATELET # BLD AUTO: 188 K/UL (ref 140–450)
PMV BLD AUTO: 7.5 FL (ref 6–12)
POTASSIUM SERPL-SCNC: 3.9 MMOL/L (ref 3.7–5.3)
RBC # BLD AUTO: 2.56 M/UL (ref 4.5–5.9)
SODIUM SERPL-SCNC: 140 MMOL/L (ref 135–144)
SPECIMEN TYPE: NORMAL
SPECIMEN TYPE: NORMAL
WBC OTHER # BLD: 3.8 K/UL (ref 3.5–11)

## 2024-05-23 PROCEDURE — 49082 ABD PARACENTESIS: CPT

## 2024-05-23 PROCEDURE — 87205 SMEAR GRAM STAIN: CPT

## 2024-05-23 PROCEDURE — 6360000002 HC RX W HCPCS: Performed by: STUDENT IN AN ORGANIZED HEALTH CARE EDUCATION/TRAINING PROGRAM

## 2024-05-23 PROCEDURE — 83615 LACTATE (LD) (LDH) ENZYME: CPT

## 2024-05-23 PROCEDURE — 6370000000 HC RX 637 (ALT 250 FOR IP): Performed by: NURSE PRACTITIONER

## 2024-05-23 PROCEDURE — 99232 SBSQ HOSP IP/OBS MODERATE 35: CPT | Performed by: NURSE PRACTITIONER

## 2024-05-23 PROCEDURE — 80048 BASIC METABOLIC PNL TOTAL CA: CPT

## 2024-05-23 PROCEDURE — 2580000003 HC RX 258: Performed by: STUDENT IN AN ORGANIZED HEALTH CARE EDUCATION/TRAINING PROGRAM

## 2024-05-23 PROCEDURE — 88342 IMHCHEM/IMCYTCHM 1ST ANTB: CPT

## 2024-05-23 PROCEDURE — 88112 CYTOPATH CELL ENHANCE TECH: CPT

## 2024-05-23 PROCEDURE — 6370000000 HC RX 637 (ALT 250 FOR IP): Performed by: STUDENT IN AN ORGANIZED HEALTH CARE EDUCATION/TRAINING PROGRAM

## 2024-05-23 PROCEDURE — 84157 ASSAY OF PROTEIN OTHER: CPT

## 2024-05-23 PROCEDURE — 85730 THROMBOPLASTIN TIME PARTIAL: CPT

## 2024-05-23 PROCEDURE — 82042 OTHER SOURCE ALBUMIN QUAN EA: CPT

## 2024-05-23 PROCEDURE — 2709999900 HC NON-CHARGEABLE SUPPLY

## 2024-05-23 PROCEDURE — 85025 COMPLETE CBC W/AUTO DIFF WBC: CPT

## 2024-05-23 PROCEDURE — 1210000000 HC MED SURG R&B

## 2024-05-23 PROCEDURE — 88305 TISSUE EXAM BY PATHOLOGIST: CPT

## 2024-05-23 PROCEDURE — 87070 CULTURE OTHR SPECIMN AEROBIC: CPT

## 2024-05-23 PROCEDURE — 36415 COLL VENOUS BLD VENIPUNCTURE: CPT

## 2024-05-23 PROCEDURE — 89051 BODY FLUID CELL COUNT: CPT

## 2024-05-23 PROCEDURE — 0W9G3ZZ DRAINAGE OF PERITONEAL CAVITY, PERCUTANEOUS APPROACH: ICD-10-PCS | Performed by: RADIOLOGY

## 2024-05-23 PROCEDURE — 99232 SBSQ HOSP IP/OBS MODERATE 35: CPT | Performed by: STUDENT IN AN ORGANIZED HEALTH CARE EDUCATION/TRAINING PROGRAM

## 2024-05-23 PROCEDURE — 88341 IMHCHEM/IMCYTCHM EA ADD ANTB: CPT

## 2024-05-23 PROCEDURE — 2500000003 HC RX 250 WO HCPCS: Performed by: RADIOLOGY

## 2024-05-23 PROCEDURE — 87075 CULTR BACTERIA EXCEPT BLOOD: CPT

## 2024-05-23 RX ORDER — LIDOCAINE HYDROCHLORIDE 10 MG/ML
INJECTION, SOLUTION EPIDURAL; INFILTRATION; INTRACAUDAL; PERINEURAL PRN
Status: COMPLETED | OUTPATIENT
Start: 2024-05-23 | End: 2024-05-23

## 2024-05-23 RX ADMIN — FUROSEMIDE 20 MG: 10 INJECTION, SOLUTION INTRAMUSCULAR; INTRAVENOUS at 08:25

## 2024-05-23 RX ADMIN — SODIUM CHLORIDE, PRESERVATIVE FREE 10 ML: 5 INJECTION INTRAVENOUS at 20:49

## 2024-05-23 RX ADMIN — APIXABAN 5 MG: 5 TABLET, FILM COATED ORAL at 20:49

## 2024-05-23 RX ADMIN — GABAPENTIN 100 MG: 100 CAPSULE ORAL at 14:37

## 2024-05-23 RX ADMIN — TAMSULOSIN HYDROCHLORIDE 0.4 MG: 0.4 CAPSULE ORAL at 08:25

## 2024-05-23 RX ADMIN — APIXABAN 5 MG: 5 TABLET, FILM COATED ORAL at 12:44

## 2024-05-23 RX ADMIN — GABAPENTIN 100 MG: 100 CAPSULE ORAL at 08:24

## 2024-05-23 RX ADMIN — AMIODARONE HYDROCHLORIDE 100 MG: 200 TABLET ORAL at 08:24

## 2024-05-23 RX ADMIN — SPIRONOLACTONE 50 MG: 25 TABLET, FILM COATED ORAL at 08:25

## 2024-05-23 RX ADMIN — GABAPENTIN 100 MG: 100 CAPSULE ORAL at 20:49

## 2024-05-23 RX ADMIN — MIDODRINE HYDROCHLORIDE 10 MG: 5 TABLET ORAL at 17:27

## 2024-05-23 RX ADMIN — HEPARIN SODIUM 9 UNITS/KG/HR: 10000 INJECTION, SOLUTION INTRAVENOUS at 06:31

## 2024-05-23 RX ADMIN — ATORVASTATIN CALCIUM 40 MG: 40 TABLET, FILM COATED ORAL at 08:24

## 2024-05-23 RX ADMIN — SODIUM CHLORIDE, PRESERVATIVE FREE 10 ML: 5 INJECTION INTRAVENOUS at 08:25

## 2024-05-23 RX ADMIN — PANTOPRAZOLE SODIUM 40 MG: 40 TABLET, DELAYED RELEASE ORAL at 08:25

## 2024-05-23 RX ADMIN — LIDOCAINE HYDROCHLORIDE 5 ML: 10 INJECTION, SOLUTION EPIDURAL; INFILTRATION; INTRACAUDAL; PERINEURAL at 09:45

## 2024-05-23 RX ADMIN — MIDODRINE HYDROCHLORIDE 10 MG: 5 TABLET ORAL at 12:44

## 2024-05-23 RX ADMIN — MIDODRINE HYDROCHLORIDE 10 MG: 5 TABLET ORAL at 08:24

## 2024-05-23 RX ADMIN — POTASSIUM CHLORIDE 20 MEQ: 1500 TABLET, EXTENDED RELEASE ORAL at 08:24

## 2024-05-23 RX ADMIN — LEVOTHYROXINE SODIUM 75 MCG: 75 TABLET ORAL at 08:24

## 2024-05-23 NOTE — BRIEF OP NOTE
Brief Postoperative Note    Eliceo Madden  YOB: 1945  [id number]    Pre-operative Diagnosis: ascites; abdominal discomfort    Post-operative Diagnosis: Same    Procedure: US guided paracentesis     Anesthesia: Local    Surgeons/Assistants: Kane    Estimated Blood Loss: less than 50     Complications: None    Specimens: Was Obtained: slightly turbid yellow ascites    Electronically signed by Víctor Middleton MD on 5/23/2024 at 11:07 AM

## 2024-05-23 NOTE — PLAN OF CARE
Problem: Discharge Planning  Goal: Discharge to home or other facility with appropriate resources  5/23/2024 0021 by Chrissy Cox, RN  Outcome: Progressing  Flowsheets (Taken 5/22/2024 2000)  Discharge to home or other facility with appropriate resources: Identify barriers to discharge with patient and caregiver     Problem: Safety - Adult  Goal: Free from fall injury  5/23/2024 0021 by Chrissy Cox, RN  Outcome: Progressing   NO FALLS OR INJURIES THIS SHIFT, BED IN LOWEST POSITION, BRAKES ON, BED ALARM ON, CALL LIGHT IN REACH, SIDE RAILS UP X2.    Problem: Skin/Tissue Integrity  Goal: Absence of new skin breakdown  Description: 1.  Monitor for areas of redness and/or skin breakdown  2.  Assess vascular access sites hourly  3.  Every 4-6 hours minimum:  Change oxygen saturation probe site  4.  Every 4-6 hours:  If on nasal continuous positive airway pressure, respiratory therapy assess nares and determine need for appliance change or resting period.  5/23/2024 0021 by Chrissy Cox RN  Outcome: Progressing     Problem: Chronic Conditions and Co-morbidities  Goal: Patient's chronic conditions and co-morbidity symptoms are monitored and maintained or improved  5/23/2024 0021 by Chrissy Cox RN  Outcome: Progressing  Flowsheets (Taken 5/22/2024 2000)  Care Plan - Patient's Chronic Conditions and Co-Morbidity Symptoms are Monitored and Maintained or Improved: Monitor and assess patient's chronic conditions and comorbid symptoms for stability, deterioration, or improvement

## 2024-05-23 NOTE — PROGRESS NOTES
[facility]  Office: [phone]  Jonah Boston DO, Vahe Dupont, DO, Naveed Callejas DO, Hammad Jenkins, DO, Federico Díaz MD, Lauren Benjamin MD, Georges Le MD, Babita Mccann MD,  Gurdeep Barcenas MD, Sabas Rai MD, Jack Duncan, DO, Altagracia Santillan MD,  Patrick Barnett DO, Jerrell Dawson MD, Farzad Nelson MD, Sung Boston DO, Kesha Luna MD,  Nick Lopez DO, Tanesha Ruano MD, Glenis Ceballos MD, Natty Begum MD, Era Olivares MD,  Jakob Seaman MD, Dharmesh Hill MD, Naomy Esparza MD, Jason Pollack DO, Shanna Talley MD,  David Philippe MD, Shirley Waterhouse, CNP,  Kristin Siddiqui, CNP, Chrissy Sweet, CNP, Elian Zhao, CNP,  Chio Ortega, DNP, Bethany Mendez, CNP, Ann Oro, CNP, Sabrina Sin, CNP, Aniya Tran, CNP, Nandini Pena, CNP, Rc Yoder PA-C, Precious Galindo, CNS, July Sarabia, CNP, Charlotte Thomson, CNP         [facility]   IN-PATIENT SERVICE   [facility]    Progress Note    5/23/2024    11:29 AM    Name:   Eliceo Madden  MRN:     [id number]     Acct:      [id number]   Room:   326/326-01   Day:  3  Admit Date:  5/20/2024  1:05 PM    PCP:   Agata Wilde DO  Code Status:  Full Code    Subjective:     Pt seen and examined this morning, wife bedside.  S/p IR guided paracentesis, heparin gtt was on hold. Will put him back on Eliquis.  Remained afebrile, No acute  events overnight.  Was NPO since mid night.  Labs reviewed, K 3.9, Hb 7.6. platelets 188.    Fluid sent for analysis.  Discussed with Pt and family regarding the CT abdomen finding, pt is aware of that and wants to follow up OP with his primary oncologist.    Medications:     Allergies:    Allergies   Allergen Reactions    No Known Allergies        Current Meds:   Scheduled Meds:    spironolactone  50 mg Oral Daily    atorvastatin  40 mg Oral Daily    pantoprazole  40 mg Oral QAM AC    tamsulosin  0.4 mg Oral Daily    sodium chloride flush  5-40 mL IntraVENous 2    MG  --   --   --  1.6  --   --   --    ANIONGAP 9  --   --  6*  --  7* 6*   LABGLOM 61  --   --  61  --  61 61   CALCIUM 7.3*  --   --  6.8*  --  7.8* 7.4*   PROBNP 5,021*  --   --   --   --   --   --    TROPHS 27* 24*  --   --   --   --   --     < > = values in this interval not displayed.       Recent Labs     05/20/24  1327   AST 30   ALT 20   ALKPHOS 165*   BILITOT 0.3       ABG:No results found for: \"POCPH\", \"PHART\", \"PH\", \"POCPCO2\", \"RHF5TCC\", \"PCO2\", \"POCPO2\", \"PO2ART\", \"PO2\", \"POCHCO3\", \"XXO2CMO\", \"HCO3\", \"NBEA\", \"PBEA\", \"BEART\", \"BE\", \"THGBART\", \"THB\", \"KZU5QCB\", \"FPPG8MXO\", \"Q3LNXZPY\", \"O2SAT\", \"FIO2\"  No results found for: \"SPECIAL\"  No results found for: \"CULTURE\"    Radiology:  US ABDOMEN LIMITED    Result Date: 5/21/2024  Moderate ascites.     XR CHEST PORTABLE    Result Date: 5/20/2024  Small bilateral pleural effusions with mild bibasilar atelectasis.     CT ABDOMEN PELVIS WO CONTRAST Additional Contrast? None    Result Date: 5/16/2024  1. Moderate right and small left pleural effusions and lower lobe atelectatic changes. 2. Mild pericardial effusion. 3. Shrinkage of the liver and suspicion for cirrhosis due to some irregularity. 4. Moderate ascites and mesenteric edema. 5. Retroperitoneal lymphadenopathy. 6. Calcified mesenteric mass. Differential diagnosis includes desmoid tumor, sarcoma, mesenteric fibrosis with calcification or metastatic disease. 7. Diverticulosis but no acute diverticulitis. 8. Bilateral inguinal hernias containing mesentery and fluid no bowel involvement. 9. Ventral hernia containing mesentery and fluid. 10. Anasarca. RECOMMENDATIONS: Abdominal MRI or contrast enhanced CT to further evaluate the mesenteric mass which may represent a primary malignancy.       Physical Examination:     General appearance:  alert, cooperative and no distress  Mental Status:  oriented to person, place and time and normal affect  Lungs:  clear to auscultation bilaterally, normal effort  Heart:

## 2024-05-23 NOTE — PLAN OF CARE
Problem: Chronic Conditions and Co-morbidities  Goal: Patient's chronic conditions and co-morbidity symptoms are monitored and maintained or improved  Outcome: Progressing     Problem: Discharge Planning  Goal: Discharge to home or other facility with appropriate resources  Outcome: Progressing   Possible discharge plans tomorrow.  following.

## 2024-05-23 NOTE — PROGRESS NOTES
[place] [facility]    Gastroenterology Daily Progress Note      Patient:   Eliceo Madden   :    1945   Facility:   [facility]  Date:     2024  Consultant:   JEANNETTE Guzman - CNP, CNP      SUBJECTIVE  79 y.o. male admitted 2024 with Acute systolic congestive heart failure (HCC) [I50.21]  Acute on chronic clinical systolic heart failure (HCC) [I50.23] and seen for .ascites and cirrhosis. The pt was seen and examined. Paracentesis has not been completed yet, creat remains normal with additional diuretics. Liver eval ongoing. No c/o abdominal pain or nausea had non bloody stool this am. Feels that his \"swelling to my legs is better\".         OBJECTIVE  Scheduled Meds:   spironolactone  50 mg Oral Daily    atorvastatin  40 mg Oral Daily    pantoprazole  40 mg Oral QAM AC    tamsulosin  0.4 mg Oral Daily    sodium chloride flush  5-40 mL IntraVENous 2 times per day    amiodarone  100 mg Oral Daily    [Held by provider] apixaban  5 mg Oral BID    gabapentin  100 mg Oral TID    midodrine  10 mg Oral TID    potassium chloride  20 mEq Oral Daily with breakfast    levothyroxine  75 mcg Oral Daily    furosemide  20 mg IntraVENous Daily       Vital Signs:  /64   Pulse 75   Temp 98.4 °F (36.9 °C) (Oral)   Resp 16   Ht 1.8 m (5' 10.87\")   Wt 106 kg (233 lb 11 oz)   SpO2 95%   BMI 32.72 kg/m²    Review of Systems - History obtained from chart review and the patient  General ROS: negative  Respiratory ROS: no cough, shortness of breath, or wheezing  Cardiovascular ROS: no chest pain or dyspnea on exertion  Gastrointestinal ROS: positive for - ascites   Physical Exam:     General Appearance: alert and oriented to person, place and time, well-developed and well-nourished, in no acute distress  Skin: warm and dry, no rash or erythema  Head: normocephalic and atraumatic  Eyes: pupils equal, round, and reactive to light, extraocular eye movements intact, conjunctivae normal  ENT:

## 2024-05-23 NOTE — PROGRESS NOTES
Patient back from I.R. for paracentesis. No acute distress noted. Vitals as charted. Connected to telemetry. Dressing site to Right lower abdomen C/D/I. Wife at bedside.

## 2024-05-24 VITALS
RESPIRATION RATE: 18 BRPM | HEIGHT: 71 IN | TEMPERATURE: 97.5 F | BODY MASS INDEX: 30.86 KG/M2 | DIASTOLIC BLOOD PRESSURE: 61 MMHG | HEART RATE: 73 BPM | WEIGHT: 220.46 LBS | SYSTOLIC BLOOD PRESSURE: 101 MMHG | OXYGEN SATURATION: 98 %

## 2024-05-24 PROBLEM — R60.0 BILATERAL LOWER EXTREMITY EDEMA: Status: RESOLVED | Noted: 2024-05-20 | Resolved: 2024-05-24

## 2024-05-24 LAB
ANA SER QL IA: NEGATIVE
BODY FLD TYPE: NORMAL
CASE NUMBER:: NORMAL
DSDNA IGG SER QL IA: 0.7 IU/ML
LKM AB TITR SER IF: NORMAL {TITER}
LYMPHOCYTES NFR FLD: 26 %
MITOCHONDRIA M2 IGG SER-ACNC: 0.6 U/ML (ref 0–4)
NEUTROPHILS NFR FLD: 30 %
NUCLEAR IGG SER IA-RTO: 0.2 U/ML
PROT FLD-MCNC: 1.6 G/DL
RBC # FLD: <3000 CELLS/UL
SMOOTH MUSCLE ANTIBODY: 5 UNITS (ref 0–19)
SPECIMEN DESCRIPTION: NORMAL
SPECIMEN TYPE: NORMAL
UNIDENT CELLS NFR FLD: NORMAL %
WBC # FLD: 71 CELLS/UL

## 2024-05-24 PROCEDURE — 6370000000 HC RX 637 (ALT 250 FOR IP): Performed by: NURSE PRACTITIONER

## 2024-05-24 PROCEDURE — 2580000003 HC RX 258: Performed by: STUDENT IN AN ORGANIZED HEALTH CARE EDUCATION/TRAINING PROGRAM

## 2024-05-24 PROCEDURE — 6360000002 HC RX W HCPCS: Performed by: STUDENT IN AN ORGANIZED HEALTH CARE EDUCATION/TRAINING PROGRAM

## 2024-05-24 PROCEDURE — 6370000000 HC RX 637 (ALT 250 FOR IP): Performed by: STUDENT IN AN ORGANIZED HEALTH CARE EDUCATION/TRAINING PROGRAM

## 2024-05-24 PROCEDURE — 99232 SBSQ HOSP IP/OBS MODERATE 35: CPT | Performed by: NURSE PRACTITIONER

## 2024-05-24 PROCEDURE — 99232 SBSQ HOSP IP/OBS MODERATE 35: CPT | Performed by: STUDENT IN AN ORGANIZED HEALTH CARE EDUCATION/TRAINING PROGRAM

## 2024-05-24 RX ORDER — SPIRONOLACTONE 50 MG/1
50 TABLET, FILM COATED ORAL DAILY
Qty: 30 TABLET | Refills: 3 | Status: SHIPPED | OUTPATIENT
Start: 2024-05-25

## 2024-05-24 RX ORDER — FUROSEMIDE 40 MG/1
40 TABLET ORAL DAILY
Qty: 60 TABLET | Refills: 3 | Status: SHIPPED | OUTPATIENT
Start: 2024-05-24

## 2024-05-24 RX ADMIN — TAMSULOSIN HYDROCHLORIDE 0.4 MG: 0.4 CAPSULE ORAL at 09:01

## 2024-05-24 RX ADMIN — SODIUM CHLORIDE, PRESERVATIVE FREE 10 ML: 5 INJECTION INTRAVENOUS at 09:00

## 2024-05-24 RX ADMIN — AMIODARONE HYDROCHLORIDE 100 MG: 200 TABLET ORAL at 09:01

## 2024-05-24 RX ADMIN — MIDODRINE HYDROCHLORIDE 10 MG: 5 TABLET ORAL at 09:01

## 2024-05-24 RX ADMIN — POTASSIUM CHLORIDE 20 MEQ: 1500 TABLET, EXTENDED RELEASE ORAL at 09:01

## 2024-05-24 RX ADMIN — PANTOPRAZOLE SODIUM 40 MG: 40 TABLET, DELAYED RELEASE ORAL at 06:49

## 2024-05-24 RX ADMIN — GABAPENTIN 100 MG: 100 CAPSULE ORAL at 09:01

## 2024-05-24 RX ADMIN — SPIRONOLACTONE 50 MG: 25 TABLET, FILM COATED ORAL at 09:00

## 2024-05-24 RX ADMIN — APIXABAN 5 MG: 5 TABLET, FILM COATED ORAL at 09:01

## 2024-05-24 RX ADMIN — LEVOTHYROXINE SODIUM 75 MCG: 75 TABLET ORAL at 06:49

## 2024-05-24 RX ADMIN — ATORVASTATIN CALCIUM 40 MG: 40 TABLET, FILM COATED ORAL at 09:01

## 2024-05-24 RX ADMIN — FUROSEMIDE 20 MG: 10 INJECTION, SOLUTION INTRAMUSCULAR; INTRAVENOUS at 09:00

## 2024-05-24 NOTE — PROGRESS NOTES
Paracentesis 5/23/24      Latest Reference Range & Units 05/23/24 09:49   RBC, Fluid cells/uL <3,000   Lymphocytes, Body Fluid % 26   LD, Fluid U/L 84   Neutrophil Count, Fluid % 30   Total Protein, Body Fluid g/dL 1.6   WBC, Fluid cells/uL 71   Albumin, Fluid g/dL 1.1   Other Cells, Fluid % PENDING      Ct abd 5/15/24  FINDINGS:  Lower Chest: Moderate right and small left pleural effusions and lower lobe  atelectatic changes.  Mild pericardial effusion.     Organs: Liver demonstrates cirrhosis and shrinkage.  Status post  cholecystectomy.  Pancreas and spleen demonstrate no acute abnormality.  Some  splenic calcification noted.  Adrenals and right kidney appear stable.  The  left kidney demonstrates multiple parapelvic cysts but no obstructive  uropathy.  No renal stones.     GI/Bowel: No evidence of bowel obstruction or perforation.  Diverticulosis  but no acute diverticulitis.     Pelvis: Urinary bladder and prostate appear stable.  Bilateral inguinal  hernias containing mesentery and fluid, but no bowel involvement.  No pelvic  lymphadenopathy.     Peritoneum/Retroperitoneum: Retroperitoneal lymphadenopathy with nodes  measuring up to 2 cm.  There is a calcified mesenteric mass which is  difficult to measure due to lack of administration of contrast.  It roughly  measures approximately 6.4 x 8.2 cm.  Differential diagnosis includes a  desmoid tumor, sarcoma or mesenteric fibrosis.  There is moderate ascites and  mesenteric edema.  A ventral hernia containing mesentery and fluid.     Bones/Soft Tissues: No evidence of focal destructive changes.  Anasarca.     IMPRESSION:  1. Moderate right and small left pleural effusions and lower lobe atelectatic  changes.  2. Mild pericardial effusion.  3. Shrinkage of the liver and suspicion for cirrhosis due to some  irregularity.  4. Moderate ascites and mesenteric edema.  5. Retroperitoneal lymphadenopathy.  6. Calcified mesenteric mass. Differential diagnosis includes  desmoid tumor,  sarcoma, mesenteric fibrosis with calcification or metastatic disease.  7. Diverticulosis but no acute diverticulitis.  8. Bilateral inguinal hernias containing mesentery and fluid no bowel  involvement.  9. Ventral hernia containing mesentery and fluid.  10. Anasarca.     RECOMMENDATIONS:  Abdominal MRI or contrast enhanced CT to further evaluate the mesenteric mass  which may represent a primary malignancy.           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 jeong .    Electronically signed by: JEANNETTE Guzman CNP on 5/24/2024 at 7:19 AM

## 2024-05-24 NOTE — DISCHARGE SUMMARY
Recommendations/Findings:   IP CONSULT TO GI      The patient was seen and examined on day of discharge and this discharge summary is in conjunction with any daily progress note from day of discharge.    Discharge plan:     Disposition: {DISPOSITIONS:[id number]}    Physician Follow Up:   Agata Wilde DO  [address]  [facility] [address]  [phone]    Follow up in 1 week(s)  Post hosp follow up visit. Pt will need BMP to check for K. Pt got Paracentesis by Janis Gonzalez MD  [address]  [facility] [id number] [phone]    Follow up in 1 week(s)         Requiring Further Evaluation/Follow Up POST HOSPITALIZATION/Incidental Findings: ***    Diet: {diet:98754}    Activity: As tolerated    Instructions to Patient: ***    Discharge Medications:      Medication List        START taking these medications      furosemide 40 MG tablet  Commonly known as: Lasix  Take 1 tablet by mouth daily     spironolactone 50 MG tablet  Commonly known as: ALDACTONE  Take 1 tablet by mouth daily  Start taking on: May 25, 2024            CHANGE how you take these medications      levothyroxine 75 MCG tablet  Commonly known as: SYNTHROID  What changed: Another medication with the same name was removed. Continue taking this medication, and follow the directions you see here.            CONTINUE taking these medications      amiodarone 200 MG tablet  Commonly known as: CORDARONE  Take 1 tablet by mouth daily     ATORVASTATIN CALCIUM PO     CoQ-10 100 MG Caps     Eliquis 5 MG Tabs tablet  Generic drug: apixaban     gabapentin 100 MG capsule  Commonly known as: NEURONTIN     midodrine 5 MG tablet  Commonly known as: PROAMATINE     Multi Vitamin Tabs     omeprazole 20 MG EC tablet     ONDANSETRON HCL PO     senna 8.6 MG tablet  Commonly known as: SENOKOT     tamsulosin 0.4 MG capsule  Commonly known as: FLOMAX     vitamin D 1.25 MG (57759 UT) Caps capsule  Commonly known as:

## 2024-05-24 NOTE — PROGRESS NOTES
SPIRITUAL CARE DEPARTMENT [facility]  PROGRESS NOTE    Room # 326/326-01   Name: Eliceo Madden            Yarsani: unknown    Reason for visit: Emotional Distress    I visited the patient.    Admit Date & Time: 5/20/2024  1:05 PM    Assessment:  Eliceo Madden is a 79 y.o. male in the hospital because lower extremity edema. Upon entering the room Pt was open to conversation and friendly.       Intervention:  I introduced myself and my title as  I offered space for Pt  to express feelings, needs, and concerns and provided a ministry presence.  prayed with Pt as requested. Provided support and presence to Pt.    Outcome:  Pt expressed support to staff. Pt expressd concerns over health issues. Pt thanked  for visit.    Plan:  Chaplains will remain available to offer spiritual and emotional support as needed.    Electronically signed by Chaplain Durga, on 5/23/2024 at 9:01 PM.  Spiritual Care Department  [facility]

## 2024-05-24 NOTE — PROGRESS NOTES
[facility]  Office: [phone]  Jonah Boston DO, Vahe Dupont DO, Naveed Callejas DO, Hammad Jenkins DO, Federico Díaz MD, Lauren Benjamin MD, Georges Le MD, Babita Mccann MD,  Gurdeep Barcenas MD, Sabas Rai MD, Jack Duncan DO, Altagracia Santillan MD,  Patrick Barnett DO, Jerrell Dawson MD, Farzad Nelson MD, Sung Boston DO, Kesha Luna MD,  Nick Lopez DO, Tanesha Ruano MD, Glenis Ceballos MD, Natty Begum MD, Era Olivares MD,  Jakob Semaan MD, Dharmesh Hill MD, Naomy Esparza MD, Jason Pollack DO, Shanna Talley MD,  David Philippe MD, Shirley Waterhouse, CNP,  Kristin Siddiqui, CNP, Chrissy Sweet, CNP, Elian Zhao, CNP,  Chio Ortega, DNP, Bethany Mendez, CNP, Ann Oro, CNP, Sabrina Sin, CNP, Aniya Tran, CNP, Nandini Pena, CNP, Rc Yoder PA-C, Precious Galindo, CNS, July Sarabia, CNP, Charlotte Thomson, CNP         [facility]   IN-PATIENT SERVICE   [facility]    Progress Note    5/24/2024    10:40 AM    Name:   Eliceo Madden  MRN:     [id number]     Acct:      [id number]   Room:   326/326-01   Day:  4  Admit Date:  5/20/2024  1:05 PM    PCP:   Agata Wilde DO  Code Status:  Full Code    Subjective:     Patient seen and examined this morning, wife bedside.  S/p IR guided paracentesis, fluid sent for cytology, pending results.  No acute events overnight.  Labs reviewed.  GI okay with discharge and signed off.    Medications:     Allergies:    Allergies   Allergen Reactions    No Known Allergies        Current Meds:   Scheduled Meds:    spironolactone  50 mg Oral Daily    atorvastatin  40 mg Oral Daily    pantoprazole  40 mg Oral QAM AC    tamsulosin  0.4 mg Oral Daily    sodium chloride flush  5-40 mL IntraVENous 2 times per day    amiodarone  100 mg Oral Daily    apixaban  5 mg Oral BID    gabapentin  100 mg Oral TID    midodrine  10 mg Oral TID    potassium chloride  20 mEq Oral Daily with breakfast    levothyroxine  75  \"STJ5KCO\", \"TLCS5LAH\", \"H9VHKFPJ\", \"O2SAT\", \"FIO2\"  No results found for: \"SPECIAL\"  Lab Results   Component Value Date/Time    CULTURE PENDING 05/23/2024 09:49 AM       Radiology:  US ABDOMEN LIMITED    Result Date: 5/21/2024  Moderate ascites.     XR CHEST PORTABLE    Result Date: 5/20/2024  Small bilateral pleural effusions with mild bibasilar atelectasis.     CT ABDOMEN PELVIS WO CONTRAST Additional Contrast? None    Result Date: 5/16/2024  1. Moderate right and small left pleural effusions and lower lobe atelectatic changes. 2. Mild pericardial effusion. 3. Shrinkage of the liver and suspicion for cirrhosis due to some irregularity. 4. Moderate ascites and mesenteric edema. 5. Retroperitoneal lymphadenopathy. 6. Calcified mesenteric mass. Differential diagnosis includes desmoid tumor, sarcoma, mesenteric fibrosis with calcification or metastatic disease. 7. Diverticulosis but no acute diverticulitis. 8. Bilateral inguinal hernias containing mesentery and fluid no bowel involvement. 9. Ventral hernia containing mesentery and fluid. 10. Anasarca. RECOMMENDATIONS: Abdominal MRI or contrast enhanced CT to further evaluate the mesenteric mass which may represent a primary malignancy.       Physical Examination:     General appearance:  alert, cooperative and no distress  Mental Status:  oriented to person, place and time and normal affect  Lungs:  clear to auscultation bilaterally, normal effort  Heart:  regular rate and rhythm, no murmur  Abdomen:  soft, nontender, distended, normal bowel sounds, palpable inguinal hernia.  Extremities: Improved bilateral lower extremity edema, no redness, tenderness in the calves  Skin:  no gross lesions, rashes, induration    Assessment:     Hospital Problems             Last Modified POA    * (Principal) Bilateral lower extremity edema 5/20/2024 Yes    Cirrhosis of liver (HCC) 5/20/2024 Yes    Long term (current) use of anticoagulants 5/20/2024 Yes    Status post placement of

## 2024-05-24 NOTE — PLAN OF CARE
Problem: Discharge Planning  Goal: Discharge to home or other facility with appropriate resources  5/24/2024 0329 by Gali Ramey RN  Outcome: Progressing   Patient actively participates in ADLs, and decision making regarding plan of care  Problem: Chronic Conditions and Co-morbidities  Goal: Patient's chronic conditions and co-morbidity symptoms are monitored and maintained or improved  5/24/2024 0329 by Gali Ramey RN  Outcome: Progressing   Problem: Safety - Adult  Goal: Free from fall injury  5/24/2024 0329 by Gali Ramey RN  Outcome: Progressing   No falls/ injuries this shift, bed in lowest position, brakes on, bed alarm on, call light in reach, side rails up x2  Problem: Skin/Tissue Integrity  Goal: Absence of new skin breakdown  Description: 1.  Monitor for areas of redness and/or skin breakdown  2.  Assess vascular access sites hourly  3.  Every 4-6 hours minimum:  Change oxygen saturation probe site  4.  Every 4-6 hours:  If on nasal continuous positive airway pressure, respiratory therapy assess nares and determine need for appliance change or resting period.  5/24/2024 0329 by Gali Ramey RN  Outcome: Progressing   No new skin breakdown noted, no new signs/symptoms of infection, continue to monitor lab work including WBC, medications administered per physician orders

## 2024-05-26 LAB
MICROORGANISM SPEC CULT: NORMAL
MICROORGANISM/AGENT SPEC: NORMAL
MICROORGANISM/AGENT SPEC: NORMAL
SPECIMEN DESCRIPTION: NORMAL

## 2024-05-29 LAB — SURGICAL PATHOLOGY REPORT: NORMAL

## 2025-05-04 ENCOUNTER — APPOINTMENT (OUTPATIENT)
Dept: GENERAL RADIOLOGY | Age: 80
DRG: 481 | End: 2025-05-04
Payer: MEDICARE

## 2025-05-04 ENCOUNTER — APPOINTMENT (OUTPATIENT)
Dept: CT IMAGING | Age: 80
DRG: 481 | End: 2025-05-04
Payer: MEDICARE

## 2025-05-04 ENCOUNTER — HOSPITAL ENCOUNTER (INPATIENT)
Age: 80
LOS: 4 days | Discharge: NURSING, REHAB OR LONG-TERM CARE | DRG: 481 | End: 2025-05-08
Attending: EMERGENCY MEDICINE | Admitting: STUDENT IN AN ORGANIZED HEALTH CARE EDUCATION/TRAINING PROGRAM
Payer: MEDICARE
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
## 2025-05-04 DIAGNOSIS — I25.9 CHEST PAIN DUE TO MYOCARDIAL ISCHEMIA, UNSPECIFIED ISCHEMIC CHEST PAIN TYPE: ICD-10-CM

## 2025-05-04 DIAGNOSIS — S72.002A CLOSED FRACTURE OF LEFT HIP, INITIAL ENCOUNTER (HCC): Primary | ICD-10-CM

## 2025-05-04 LAB
ALBUMIN SERPL-MCNC: 3.5 G/DL (ref 3.5–5.2)
ALBUMIN/GLOB SERPL: 1.8 {RATIO} (ref 1–2.5)
ALP SERPL-CCNC: 85 U/L (ref 40–129)
ALT SERPL-CCNC: 16 U/L (ref 5–41)
ANION GAP SERPL CALCULATED.3IONS-SCNC: 10 MMOL/L (ref 9–17)
AST SERPL-CCNC: 24 U/L
BASOPHILS # BLD: 0 K/UL (ref 0–0.2)
BASOPHILS NFR BLD: 0 % (ref 0–2)
BILIRUB SERPL-MCNC: 0.3 MG/DL (ref 0.3–1.2)
BILIRUB UR QL STRIP: NEGATIVE
BUN SERPL-MCNC: 40 MG/DL (ref 8–23)
CALCIUM SERPL-MCNC: 8 MG/DL (ref 8.6–10.4)
CHLORIDE SERPL-SCNC: 103 MMOL/L (ref 98–107)
CLARITY UR: CLEAR
CO2 SERPL-SCNC: 24 MMOL/L (ref 20–31)
COLOR UR: YELLOW
COMMENT: ABNORMAL
CREAT SERPL-MCNC: 1.1 MG/DL (ref 0.7–1.2)
EOSINOPHIL # BLD: 0.05 K/UL (ref 0–0.4)
EOSINOPHILS RELATIVE PERCENT: 1 % (ref 1–4)
ERYTHROCYTE [DISTWIDTH] IN BLOOD BY AUTOMATED COUNT: 35.1 % (ref 12.5–15.4)
GFR, ESTIMATED: 67 ML/MIN/1.73M2
GLUCOSE SERPL-MCNC: 115 MG/DL (ref 70–99)
GLUCOSE UR STRIP-MCNC: NEGATIVE MG/DL
HCT VFR BLD AUTO: 29.8 % (ref 41–53)
HGB BLD-MCNC: 9.2 G/DL (ref 13.5–17.5)
HGB UR QL STRIP.AUTO: NEGATIVE
INR PPP: 1.3 (ref 0.9–1.2)
KETONES UR STRIP-MCNC: NEGATIVE MG/DL
LEUKOCYTE ESTERASE UR QL STRIP: NEGATIVE
LYMPHOCYTES NFR BLD: 0.41 K/UL (ref 1–4.8)
LYMPHOCYTES RELATIVE PERCENT: 9 % (ref 24–44)
MCH RBC QN AUTO: 25.5 PG (ref 26–34)
MCHC RBC AUTO-ENTMCNC: 30.9 G/DL (ref 31–37)
MCV RBC AUTO: 82.6 FL (ref 80–100)
MONOCYTES NFR BLD: 0.59 K/UL (ref 0.1–1.2)
MONOCYTES NFR BLD: 13 % (ref 2–11)
MORPHOLOGY: ABNORMAL
NEUTROPHILS NFR BLD: 77 % (ref 36–66)
NEUTS SEG NFR BLD: 3.45 K/UL (ref 1.8–7.7)
NITRITE UR QL STRIP: NEGATIVE
PARTIAL THROMBOPLASTIN TIME: 28.7 SEC (ref 24–36)
PH UR STRIP: 6 [PH] (ref 5–8)
PLATELET # BLD AUTO: 173 K/UL (ref 140–450)
PMV BLD AUTO: 7 FL (ref 6–12)
POTASSIUM SERPL-SCNC: 4 MMOL/L (ref 3.7–5.3)
PROT SERPL-MCNC: 5.5 G/DL (ref 6.4–8.3)
PROT UR STRIP-MCNC: NEGATIVE MG/DL
PROTHROMBIN TIME: 15.7 SEC (ref 11.8–14.6)
RBC # BLD AUTO: 3.6 M/UL (ref 4.5–5.9)
SODIUM SERPL-SCNC: 137 MMOL/L (ref 135–144)
SP GR UR STRIP: <1.005 (ref 1–1.03)
UROBILINOGEN UR STRIP-ACNC: NORMAL EU/DL (ref 0–1)
WBC OTHER # BLD: 4.5 K/UL (ref 3.5–11)

## 2025-05-04 PROCEDURE — 99285 EMERGENCY DEPT VISIT HI MDM: CPT

## 2025-05-04 PROCEDURE — 6360000002 HC RX W HCPCS: Performed by: NURSE PRACTITIONER

## 2025-05-04 PROCEDURE — 96375 TX/PRO/DX INJ NEW DRUG ADDON: CPT

## 2025-05-04 PROCEDURE — 2580000003 HC RX 258: Performed by: NURSE PRACTITIONER

## 2025-05-04 PROCEDURE — 85025 COMPLETE CBC W/AUTO DIFF WBC: CPT

## 2025-05-04 PROCEDURE — 6360000002 HC RX W HCPCS

## 2025-05-04 PROCEDURE — 71045 X-RAY EXAM CHEST 1 VIEW: CPT

## 2025-05-04 PROCEDURE — 2580000003 HC RX 258

## 2025-05-04 PROCEDURE — 70450 CT HEAD/BRAIN W/O DYE: CPT

## 2025-05-04 PROCEDURE — 80053 COMPREHEN METABOLIC PANEL: CPT

## 2025-05-04 PROCEDURE — 85730 THROMBOPLASTIN TIME PARTIAL: CPT

## 2025-05-04 PROCEDURE — 73502 X-RAY EXAM HIP UNI 2-3 VIEWS: CPT

## 2025-05-04 PROCEDURE — 81003 URINALYSIS AUTO W/O SCOPE: CPT

## 2025-05-04 PROCEDURE — 1200000000 HC SEMI PRIVATE

## 2025-05-04 PROCEDURE — 72125 CT NECK SPINE W/O DYE: CPT

## 2025-05-04 PROCEDURE — 96374 THER/PROPH/DIAG INJ IV PUSH: CPT

## 2025-05-04 PROCEDURE — 85610 PROTHROMBIN TIME: CPT

## 2025-05-04 PROCEDURE — 36415 COLL VENOUS BLD VENIPUNCTURE: CPT

## 2025-05-04 PROCEDURE — 96376 TX/PRO/DX INJ SAME DRUG ADON: CPT

## 2025-05-04 PROCEDURE — 99222 1ST HOSP IP/OBS MODERATE 55: CPT

## 2025-05-04 RX ORDER — ONDANSETRON 4 MG/1
4 TABLET, ORALLY DISINTEGRATING ORAL EVERY 8 HOURS PRN
Status: DISCONTINUED | OUTPATIENT
Start: 2025-05-04 | End: 2025-05-05 | Stop reason: SDUPTHER

## 2025-05-04 RX ORDER — ACETAMINOPHEN 325 MG/1
650 TABLET ORAL EVERY 6 HOURS PRN
Status: DISCONTINUED | OUTPATIENT
Start: 2025-05-04 | End: 2025-05-08 | Stop reason: HOSPADM

## 2025-05-04 RX ORDER — LEVOTHYROXINE SODIUM 75 UG/1
75 TABLET ORAL DAILY
Status: DISCONTINUED | OUTPATIENT
Start: 2025-05-05 | End: 2025-05-08 | Stop reason: HOSPADM

## 2025-05-04 RX ORDER — FUROSEMIDE 20 MG/1
20 TABLET ORAL DAILY
Status: DISCONTINUED | OUTPATIENT
Start: 2025-05-05 | End: 2025-05-08 | Stop reason: HOSPADM

## 2025-05-04 RX ORDER — MIDODRINE HYDROCHLORIDE 5 MG/1
10 TABLET ORAL 3 TIMES DAILY
Status: DISCONTINUED | OUTPATIENT
Start: 2025-05-04 | End: 2025-05-07

## 2025-05-04 RX ORDER — ATORVASTATIN CALCIUM 40 MG/1
40 TABLET, FILM COATED ORAL DAILY
Status: DISCONTINUED | COMMUNITY
Start: 2025-05-05 | End: 2025-05-08 | Stop reason: HOSPADM

## 2025-05-04 RX ORDER — TAMSULOSIN HYDROCHLORIDE 0.4 MG/1
0.4 CAPSULE ORAL DAILY
Status: CANCELLED | OUTPATIENT
Start: 2025-05-04

## 2025-05-04 RX ORDER — GABAPENTIN 100 MG/1
100 CAPSULE ORAL 3 TIMES DAILY
Status: DISCONTINUED | OUTPATIENT
Start: 2025-05-04 | End: 2025-05-08 | Stop reason: HOSPADM

## 2025-05-04 RX ORDER — 0.9 % SODIUM CHLORIDE 0.9 %
1000 INTRAVENOUS SOLUTION INTRAVENOUS ONCE
Status: COMPLETED | OUTPATIENT
Start: 2025-05-04 | End: 2025-05-04

## 2025-05-04 RX ORDER — ONDANSETRON 2 MG/ML
4 INJECTION INTRAMUSCULAR; INTRAVENOUS ONCE
Status: COMPLETED | OUTPATIENT
Start: 2025-05-04 | End: 2025-05-04

## 2025-05-04 RX ORDER — DEXTROSE MONOHYDRATE 100 MG/ML
INJECTION, SOLUTION INTRAVENOUS CONTINUOUS PRN
Status: DISCONTINUED | OUTPATIENT
Start: 2025-05-04 | End: 2025-05-08 | Stop reason: HOSPADM

## 2025-05-04 RX ORDER — SPIRONOLACTONE 25 MG/1
25 TABLET ORAL DAILY
Status: DISCONTINUED | OUTPATIENT
Start: 2025-05-05 | End: 2025-05-08 | Stop reason: HOSPADM

## 2025-05-04 RX ORDER — POTASSIUM CHLORIDE 1500 MG/1
40 TABLET, EXTENDED RELEASE ORAL PRN
Status: DISCONTINUED | OUTPATIENT
Start: 2025-05-04 | End: 2025-05-08 | Stop reason: HOSPADM

## 2025-05-04 RX ORDER — POTASSIUM CHLORIDE 7.45 MG/ML
10 INJECTION INTRAVENOUS PRN
Status: DISCONTINUED | OUTPATIENT
Start: 2025-05-04 | End: 2025-05-08 | Stop reason: HOSPADM

## 2025-05-04 RX ORDER — SODIUM CHLORIDE 0.9 % (FLUSH) 0.9 %
10 SYRINGE (ML) INJECTION PRN
Status: DISCONTINUED | OUTPATIENT
Start: 2025-05-04 | End: 2025-05-08 | Stop reason: HOSPADM

## 2025-05-04 RX ORDER — ONDANSETRON 2 MG/ML
4 INJECTION INTRAMUSCULAR; INTRAVENOUS EVERY 6 HOURS PRN
Status: DISCONTINUED | OUTPATIENT
Start: 2025-05-04 | End: 2025-05-05 | Stop reason: SDUPTHER

## 2025-05-04 RX ORDER — ACETAMINOPHEN 650 MG/1
650 SUPPOSITORY RECTAL EVERY 6 HOURS PRN
Status: DISCONTINUED | OUTPATIENT
Start: 2025-05-04 | End: 2025-05-08 | Stop reason: HOSPADM

## 2025-05-04 RX ORDER — POLYETHYLENE GLYCOL 3350 17 G/17G
17 POWDER, FOR SOLUTION ORAL DAILY PRN
Status: DISCONTINUED | OUTPATIENT
Start: 2025-05-04 | End: 2025-05-08 | Stop reason: HOSPADM

## 2025-05-04 RX ORDER — SODIUM CHLORIDE 0.9 % (FLUSH) 0.9 %
5-40 SYRINGE (ML) INJECTION EVERY 12 HOURS SCHEDULED
Status: DISCONTINUED | OUTPATIENT
Start: 2025-05-04 | End: 2025-05-08 | Stop reason: HOSPADM

## 2025-05-04 RX ORDER — MAGNESIUM SULFATE 1 G/100ML
1000 INJECTION INTRAVENOUS PRN
Status: DISCONTINUED | OUTPATIENT
Start: 2025-05-04 | End: 2025-05-08 | Stop reason: HOSPADM

## 2025-05-04 RX ORDER — SODIUM CHLORIDE 9 MG/ML
INJECTION, SOLUTION INTRAVENOUS CONTINUOUS
Status: ACTIVE | OUTPATIENT
Start: 2025-05-04 | End: 2025-05-05

## 2025-05-04 RX ORDER — FENTANYL CITRATE 50 UG/ML
25 INJECTION, SOLUTION INTRAMUSCULAR; INTRAVENOUS ONCE
Refills: 0 | Status: COMPLETED | OUTPATIENT
Start: 2025-05-04 | End: 2025-05-04

## 2025-05-04 RX ORDER — INSULIN LISPRO 100 [IU]/ML
0-8 INJECTION, SOLUTION INTRAVENOUS; SUBCUTANEOUS EVERY 6 HOURS SCHEDULED
Status: DISCONTINUED | OUTPATIENT
Start: 2025-05-05 | End: 2025-05-05

## 2025-05-04 RX ORDER — SODIUM CHLORIDE 9 MG/ML
INJECTION, SOLUTION INTRAVENOUS PRN
Status: DISCONTINUED | OUTPATIENT
Start: 2025-05-04 | End: 2025-05-08 | Stop reason: HOSPADM

## 2025-05-04 RX ORDER — AMIODARONE HYDROCHLORIDE 200 MG/1
100 TABLET ORAL DAILY
Status: DISCONTINUED | OUTPATIENT
Start: 2025-05-05 | End: 2025-05-05

## 2025-05-04 RX ORDER — GLUCAGON 1 MG/ML
1 KIT INJECTION PRN
Status: DISCONTINUED | OUTPATIENT
Start: 2025-05-04 | End: 2025-05-08 | Stop reason: HOSPADM

## 2025-05-04 RX ADMIN — SODIUM CHLORIDE: 0.9 INJECTION, SOLUTION INTRAVENOUS at 23:06

## 2025-05-04 RX ADMIN — HYDROMORPHONE HYDROCHLORIDE 0.5 MG: 1 INJECTION, SOLUTION INTRAMUSCULAR; INTRAVENOUS; SUBCUTANEOUS at 22:44

## 2025-05-04 RX ADMIN — HYDROMORPHONE HYDROCHLORIDE 0.5 MG: 1 INJECTION, SOLUTION INTRAMUSCULAR; INTRAVENOUS; SUBCUTANEOUS at 18:35

## 2025-05-04 RX ADMIN — SODIUM CHLORIDE 1000 ML: 0.9 INJECTION, SOLUTION INTRAVENOUS at 17:56

## 2025-05-04 RX ADMIN — FENTANYL CITRATE 25 MCG: 50 INJECTION, SOLUTION INTRAMUSCULAR; INTRAVENOUS at 19:37

## 2025-05-04 RX ADMIN — HYDROMORPHONE HYDROCHLORIDE 0.5 MG: 1 INJECTION, SOLUTION INTRAMUSCULAR; INTRAVENOUS; SUBCUTANEOUS at 17:56

## 2025-05-04 RX ADMIN — ONDANSETRON 4 MG: 2 INJECTION, SOLUTION INTRAMUSCULAR; INTRAVENOUS at 17:56

## 2025-05-04 ASSESSMENT — PAIN DESCRIPTION - LOCATION
LOCATION: HIP
LOCATION: HIP

## 2025-05-04 ASSESSMENT — PAIN - FUNCTIONAL ASSESSMENT
PAIN_FUNCTIONAL_ASSESSMENT: PREVENTS OR INTERFERES WITH ALL ACTIVE AND SOME PASSIVE ACTIVITIES
PAIN_FUNCTIONAL_ASSESSMENT: 0-10
PAIN_FUNCTIONAL_ASSESSMENT: PREVENTS OR INTERFERES WITH ALL ACTIVE AND SOME PASSIVE ACTIVITIES

## 2025-05-04 ASSESSMENT — PAIN DESCRIPTION - DESCRIPTORS
DESCRIPTORS: ACHING
DESCRIPTORS: ACHING

## 2025-05-04 ASSESSMENT — PAIN SCALES - WONG BAKER: WONGBAKER_NUMERICALRESPONSE: NO HURT

## 2025-05-04 ASSESSMENT — PAIN SCALES - GENERAL
PAINLEVEL_OUTOF10: 7
PAINLEVEL_OUTOF10: 5
PAINLEVEL_OUTOF10: 7
PAINLEVEL_OUTOF10: 8
PAINLEVEL_OUTOF10: 4

## 2025-05-04 ASSESSMENT — PAIN DESCRIPTION - ORIENTATION
ORIENTATION: LEFT

## 2025-05-04 NOTE — ED PROVIDER NOTES
[facility] EMERGENCY DEPARTMENT  EMERGENCY DEPARTMENT ENCOUNTER      Pt Name: Eliceo Madden  MRN: [id number]  Birthdate 1945  Date of evaluation: 5/4/2025  Provider: JEANNETTE Pope CNP  8:17 PM    CHIEF COMPLAINT       Chief Complaint   Patient presents with    Hip Pain     Patient reports getting up out of chair - turned too quickly and lost balance falling and injuring L hip.          HISTORY OF PRESENT ILLNESS    Eliceo Madden is a 80 y.o. male who presents to the emergency department      This is a nontoxic-appearing 80-year-old male presenting via EMS from home patient was getting up out of his chair, states that he does have some balance issues when he had a mechanical trip fall landing on the left side, since has had pain in the left hip, was not able to get up, he called EMS due to the acute left hip pain post the fall, EMS gave the patient 20 mg of ketamine during transport for acute pain.  Patient had no previous left hip surgeries or fractures.  Patient is anticoagulated with Eliquis with history of atrial fibrillation.    The history is provided by the patient, medical records and the EMS personnel.       Nursing Notes were reviewed.    REVIEW OF SYSTEMS       Review of Systems   Constitutional:  Negative for chills and fever.   HENT:  Negative for congestion, ear pain and sneezing.    Eyes:  Negative for discharge and visual disturbance.   Respiratory:  Negative for cough and shortness of breath.    Cardiovascular:  Negative for chest pain and palpitations.   Gastrointestinal:  Negative for abdominal pain, constipation, diarrhea, nausea and vomiting.   Genitourinary:  Negative for dysuria and frequency.   Musculoskeletal:  Negative for back pain and neck pain.        Fall from standing complaints of left hip pain.   Skin:  Negative for rash and wound.   Neurological:  Negative for weakness, numbness and headaches.   Psychiatric/Behavioral:  Negative for confusion and suicidal ideas.

## 2025-05-04 NOTE — ED PROVIDER NOTES
[facility] Emergency Department  [address]  [facility] [address]  Phone: [phone]  Fax: [phone]      Attending Physician Attestation          CHIEF COMPLAINT       Chief Complaint   Patient presents with    Hip Pain     Patient reports getting up out of chair - turned too quickly and lost balance falling and injuring L hip.        DIAGNOSTIC RESULTS     LABS:  Labs Reviewed   CBC WITH AUTO DIFFERENTIAL - Abnormal; Notable for the following components:       Result Value    RBC 3.60 (*)     Hemoglobin 9.2 (*)     Hematocrit 29.8 (*)     MCH 25.5 (*)     MCHC 30.9 (*)     RDW 35.1 (*)     Neutrophils % 77 (*)     Lymphocytes % 9 (*)     Monocytes % 13 (*)     Lymphocytes Absolute 0.41 (*)     All other components within normal limits   COMPREHENSIVE METABOLIC PANEL - Abnormal; Notable for the following components:    Glucose 115 (*)     BUN 40 (*)     Calcium 8.0 (*)     Total Protein 5.5 (*)     All other components within normal limits   PROTIME-INR - Abnormal; Notable for the following components:    Protime 15.7 (*)     INR 1.3 (*)     All other components within normal limits   APTT   URINALYSIS       All other labs were within normal range or not returned as of this dictation.    RADIOLOGY:  XR CHEST PORTABLE    (Results Pending)   XR HIP LEFT (2-3 VIEWS)    (Results Pending)   CT HEAD WO CONTRAST    (Results Pending)   CT CERVICAL SPINE WO CONTRAST    (Results Pending)         EMERGENCY DEPARTMENT COURSE:   Vitals:    Vitals:    05/04/25 1738 05/04/25 1740   BP:  (!) 101/51   Pulse: 77    Resp: 18    Temp: 97.9 °F (36.6 °C)    TempSrc: Oral    SpO2: 100%    Weight: 84.4 kg (186 lb)    Height: 1.803 m (5' 11\")      -------------------------  BP: (!) 101/51, Temp: 97.9 °F (36.6 °C), Pulse: 77, Respirations: 18             PERTINENT ATTENDING PHYSICIAN COMMENTS:    I performed a history and physical examination of the patient and discussed management with the mid level

## 2025-05-05 ENCOUNTER — ANESTHESIA EVENT (OUTPATIENT)
Dept: OPERATING ROOM | Age: 80
DRG: 481 | End: 2025-05-05
Payer: MEDICARE

## 2025-05-05 ENCOUNTER — ANESTHESIA (OUTPATIENT)
Dept: OPERATING ROOM | Age: 80
DRG: 481 | End: 2025-05-05
Payer: MEDICARE

## 2025-05-05 ENCOUNTER — APPOINTMENT (OUTPATIENT)
Dept: GENERAL RADIOLOGY | Age: 80
DRG: 481 | End: 2025-05-05
Payer: MEDICARE

## 2025-05-05 PROBLEM — I95.0 IDIOPATHIC HYPOTENSION: Status: RESOLVED | Noted: 2020-05-26 | Resolved: 2025-05-05

## 2025-05-05 PROBLEM — I48.0 PAF (PAROXYSMAL ATRIAL FIBRILLATION) (HCC): Status: RESOLVED | Noted: 2018-05-02 | Resolved: 2025-05-05

## 2025-05-05 PROBLEM — E11.9 DIABETES MELLITUS TYPE 2, DIET-CONTROLLED (HCC): Chronic | Status: RESOLVED | Noted: 2017-04-03 | Resolved: 2025-05-05

## 2025-05-05 PROBLEM — S72.002A CLOSED FRACTURE OF LEFT HIP (HCC): Status: ACTIVE | Noted: 2025-05-05

## 2025-05-05 LAB
ALBUMIN SERPL-MCNC: 3.4 G/DL (ref 3.5–5.2)
ALBUMIN/GLOB SERPL: 1.8 {RATIO} (ref 1–2.5)
ALP SERPL-CCNC: 83 U/L (ref 40–129)
ALT SERPL-CCNC: 15 U/L (ref 5–41)
ANION GAP SERPL CALCULATED.3IONS-SCNC: 9 MMOL/L (ref 9–17)
AST SERPL-CCNC: 24 U/L
BASOPHILS # BLD: 0 K/UL (ref 0–0.2)
BASOPHILS NFR BLD: 0 % (ref 0–2)
BILIRUB SERPL-MCNC: 0.4 MG/DL (ref 0.3–1.2)
BUN SERPL-MCNC: 35 MG/DL (ref 8–23)
CALCIUM SERPL-MCNC: 7.9 MG/DL (ref 8.6–10.4)
CHLORIDE SERPL-SCNC: 107 MMOL/L (ref 98–107)
CO2 SERPL-SCNC: 23 MMOL/L (ref 20–31)
CREAT SERPL-MCNC: 1.2 MG/DL (ref 0.7–1.2)
EOSINOPHIL # BLD: 0 K/UL (ref 0–0.4)
EOSINOPHILS RELATIVE PERCENT: 0 % (ref 1–4)
ERYTHROCYTE [DISTWIDTH] IN BLOOD BY AUTOMATED COUNT: 35.3 % (ref 12.5–15.4)
EST. AVERAGE GLUCOSE BLD GHB EST-MCNC: 85 MG/DL
GFR, ESTIMATED: 61 ML/MIN/1.73M2
GLUCOSE BLD-MCNC: 118 MG/DL (ref 75–110)
GLUCOSE BLD-MCNC: 121 MG/DL (ref 75–110)
GLUCOSE SERPL-MCNC: 123 MG/DL (ref 70–99)
HBA1C MFR BLD: 4.6 % (ref 4–6)
HCT VFR BLD AUTO: 27.6 % (ref 41–53)
HGB BLD-MCNC: 8.7 G/DL (ref 13.5–17.5)
INR PPP: 1.3 (ref 0.9–1.2)
LYMPHOCYTES NFR BLD: 0.22 K/UL (ref 1–4.8)
LYMPHOCYTES RELATIVE PERCENT: 4 % (ref 24–44)
MCH RBC QN AUTO: 26 PG (ref 26–34)
MCHC RBC AUTO-ENTMCNC: 31.3 G/DL (ref 31–37)
MCV RBC AUTO: 83.1 FL (ref 80–100)
MONOCYTES NFR BLD: 0.78 K/UL (ref 0.1–1.2)
MONOCYTES NFR BLD: 14 % (ref 2–11)
MORPHOLOGY: ABNORMAL
NEUTROPHILS NFR BLD: 82 % (ref 36–66)
NEUTS SEG NFR BLD: 4.6 K/UL (ref 1.8–7.7)
PLATELET # BLD AUTO: 168 K/UL (ref 140–450)
PMV BLD AUTO: 7.2 FL (ref 6–12)
POTASSIUM SERPL-SCNC: 4.4 MMOL/L (ref 3.7–5.3)
PROT SERPL-MCNC: 5.3 G/DL (ref 6.4–8.3)
PROTHROMBIN TIME: 15.9 SEC (ref 11.8–14.6)
RBC # BLD AUTO: 3.32 M/UL (ref 4.5–5.9)
SODIUM SERPL-SCNC: 139 MMOL/L (ref 135–144)
WBC OTHER # BLD: 5.6 K/UL (ref 3.5–11)

## 2025-05-05 PROCEDURE — 2580000003 HC RX 258

## 2025-05-05 PROCEDURE — 6360000002 HC RX W HCPCS: Performed by: ANESTHESIOLOGY

## 2025-05-05 PROCEDURE — 2709999900 HC NON-CHARGEABLE SUPPLY: Performed by: ORTHOPAEDIC SURGERY

## 2025-05-05 PROCEDURE — 99223 1ST HOSP IP/OBS HIGH 75: CPT | Performed by: ORTHOPAEDIC SURGERY

## 2025-05-05 PROCEDURE — 94761 N-INVAS EAR/PLS OXIMETRY MLT: CPT

## 2025-05-05 PROCEDURE — 1200000000 HC SEMI PRIVATE

## 2025-05-05 PROCEDURE — 27245 TREAT THIGH FRACTURE: CPT | Performed by: ORTHOPAEDIC SURGERY

## 2025-05-05 PROCEDURE — 85610 PROTHROMBIN TIME: CPT

## 2025-05-05 PROCEDURE — P9041 ALBUMIN (HUMAN),5%, 50ML: HCPCS | Performed by: SPECIALIST

## 2025-05-05 PROCEDURE — C1769 GUIDE WIRE: HCPCS | Performed by: ORTHOPAEDIC SURGERY

## 2025-05-05 PROCEDURE — 7100000000 HC PACU RECOVERY - FIRST 15 MIN: Performed by: ORTHOPAEDIC SURGERY

## 2025-05-05 PROCEDURE — 3600000014 HC SURGERY LEVEL 4 ADDTL 15MIN: Performed by: ORTHOPAEDIC SURGERY

## 2025-05-05 PROCEDURE — 2500000003 HC RX 250 WO HCPCS: Performed by: ANESTHESIOLOGY

## 2025-05-05 PROCEDURE — C1713 ANCHOR/SCREW BN/BN,TIS/BN: HCPCS | Performed by: ORTHOPAEDIC SURGERY

## 2025-05-05 PROCEDURE — 6360000002 HC RX W HCPCS: Performed by: SPECIALIST

## 2025-05-05 PROCEDURE — 3700000001 HC ADD 15 MINUTES (ANESTHESIA): Performed by: ORTHOPAEDIC SURGERY

## 2025-05-05 PROCEDURE — 82947 ASSAY GLUCOSE BLOOD QUANT: CPT

## 2025-05-05 PROCEDURE — 6360000002 HC RX W HCPCS: Performed by: ORTHOPAEDIC SURGERY

## 2025-05-05 PROCEDURE — 2720000010 HC SURG SUPPLY STERILE: Performed by: ORTHOPAEDIC SURGERY

## 2025-05-05 PROCEDURE — 0QS736Z REPOSITION LEFT UPPER FEMUR WITH INTRAMEDULLARY INTERNAL FIXATION DEVICE, PERCUTANEOUS APPROACH: ICD-10-PCS | Performed by: ORTHOPAEDIC SURGERY

## 2025-05-05 PROCEDURE — 99232 SBSQ HOSP IP/OBS MODERATE 35: CPT | Performed by: STUDENT IN AN ORGANIZED HEALTH CARE EDUCATION/TRAINING PROGRAM

## 2025-05-05 PROCEDURE — 6360000002 HC RX W HCPCS

## 2025-05-05 PROCEDURE — 2580000003 HC RX 258: Performed by: ANESTHESIOLOGY

## 2025-05-05 PROCEDURE — 27245 TREAT THIGH FRACTURE: CPT | Performed by: NURSE PRACTITIONER

## 2025-05-05 PROCEDURE — 6370000000 HC RX 637 (ALT 250 FOR IP)

## 2025-05-05 PROCEDURE — 7100000001 HC PACU RECOVERY - ADDTL 15 MIN: Performed by: ORTHOPAEDIC SURGERY

## 2025-05-05 PROCEDURE — 2500000003 HC RX 250 WO HCPCS

## 2025-05-05 PROCEDURE — 83036 HEMOGLOBIN GLYCOSYLATED A1C: CPT

## 2025-05-05 PROCEDURE — 36415 COLL VENOUS BLD VENIPUNCTURE: CPT

## 2025-05-05 PROCEDURE — 2500000003 HC RX 250 WO HCPCS: Performed by: NURSE PRACTITIONER

## 2025-05-05 PROCEDURE — 3600000004 HC SURGERY LEVEL 4 BASE: Performed by: ORTHOPAEDIC SURGERY

## 2025-05-05 PROCEDURE — 80053 COMPREHEN METABOLIC PANEL: CPT

## 2025-05-05 PROCEDURE — 3700000000 HC ANESTHESIA ATTENDED CARE: Performed by: ORTHOPAEDIC SURGERY

## 2025-05-05 PROCEDURE — 2700000000 HC OXYGEN THERAPY PER DAY

## 2025-05-05 PROCEDURE — 85025 COMPLETE CBC W/AUTO DIFF WBC: CPT

## 2025-05-05 DEVICE — IMPLANTABLE DEVICE: Type: IMPLANTABLE DEVICE | Site: HIP | Status: FUNCTIONAL

## 2025-05-05 DEVICE — SCREW BNE L100MM DIA10.35MM G TI CANN PERF FOR PROX FEM: Type: IMPLANTABLE DEVICE | Site: HIP | Status: FUNCTIONAL

## 2025-05-05 DEVICE — LOCKING SCREW FOR IM NAIL Ø 5MM/ 38MM/ XL25/ STERILE: Type: IMPLANTABLE DEVICE | Site: HIP | Status: FUNCTIONAL

## 2025-05-05 RX ORDER — IPRATROPIUM BROMIDE AND ALBUTEROL SULFATE 2.5; .5 MG/3ML; MG/3ML
1 SOLUTION RESPIRATORY (INHALATION)
Status: DISCONTINUED | OUTPATIENT
Start: 2025-05-05 | End: 2025-05-05 | Stop reason: HOSPADM

## 2025-05-05 RX ORDER — ALBUMIN HUMAN 50 G/1000ML
SOLUTION INTRAVENOUS
Status: DISCONTINUED | OUTPATIENT
Start: 2025-05-05 | End: 2025-05-05 | Stop reason: SDUPTHER

## 2025-05-05 RX ORDER — MIDAZOLAM HYDROCHLORIDE 2 MG/2ML
2 INJECTION, SOLUTION INTRAMUSCULAR; INTRAVENOUS
Status: DISCONTINUED | OUTPATIENT
Start: 2025-05-05 | End: 2025-05-05 | Stop reason: HOSPADM

## 2025-05-05 RX ORDER — MORPHINE SULFATE 2 MG/ML
2 INJECTION, SOLUTION INTRAMUSCULAR; INTRAVENOUS EVERY 5 MIN PRN
Status: DISCONTINUED | OUTPATIENT
Start: 2025-05-05 | End: 2025-05-05 | Stop reason: HOSPADM

## 2025-05-05 RX ORDER — SODIUM CHLORIDE 0.9 % (FLUSH) 0.9 %
5-40 SYRINGE (ML) INJECTION PRN
Status: DISCONTINUED | OUTPATIENT
Start: 2025-05-05 | End: 2025-05-05 | Stop reason: HOSPADM

## 2025-05-05 RX ORDER — SODIUM CHLORIDE, SODIUM LACTATE, POTASSIUM CHLORIDE, CALCIUM CHLORIDE 600; 310; 30; 20 MG/100ML; MG/100ML; MG/100ML; MG/100ML
INJECTION, SOLUTION INTRAVENOUS CONTINUOUS
Status: DISCONTINUED | OUTPATIENT
Start: 2025-05-05 | End: 2025-05-05 | Stop reason: HOSPADM

## 2025-05-05 RX ORDER — OXYCODONE HYDROCHLORIDE 5 MG/1
5 TABLET ORAL EVERY 4 HOURS PRN
Status: DISCONTINUED | OUTPATIENT
Start: 2025-05-05 | End: 2025-05-08 | Stop reason: HOSPADM

## 2025-05-05 RX ORDER — ONDANSETRON 2 MG/ML
4 INJECTION INTRAMUSCULAR; INTRAVENOUS EVERY 6 HOURS PRN
Status: DISCONTINUED | OUTPATIENT
Start: 2025-05-05 | End: 2025-05-08 | Stop reason: HOSPADM

## 2025-05-05 RX ORDER — PHENYLEPHRINE HCL IN 0.9% NACL 1 MG/10 ML
SYRINGE (ML) INTRAVENOUS
Status: DISCONTINUED | OUTPATIENT
Start: 2025-05-05 | End: 2025-05-05 | Stop reason: SDUPTHER

## 2025-05-05 RX ORDER — ROPIVACAINE HYDROCHLORIDE 5 MG/ML
INJECTION, SOLUTION EPIDURAL; INFILTRATION; PERINEURAL PRN
Status: DISCONTINUED | OUTPATIENT
Start: 2025-05-05 | End: 2025-05-05 | Stop reason: ALTCHOICE

## 2025-05-05 RX ORDER — SODIUM CHLORIDE 0.9 % (FLUSH) 0.9 %
5-40 SYRINGE (ML) INJECTION EVERY 12 HOURS SCHEDULED
Status: DISCONTINUED | OUTPATIENT
Start: 2025-05-05 | End: 2025-05-08 | Stop reason: HOSPADM

## 2025-05-05 RX ORDER — PROPOFOL 10 MG/ML
INJECTION, EMULSION INTRAVENOUS
Status: DISCONTINUED | OUTPATIENT
Start: 2025-05-05 | End: 2025-05-05 | Stop reason: SDUPTHER

## 2025-05-05 RX ORDER — SODIUM CHLORIDE 0.9 % (FLUSH) 0.9 %
5-40 SYRINGE (ML) INJECTION PRN
Status: DISCONTINUED | OUTPATIENT
Start: 2025-05-05 | End: 2025-05-08 | Stop reason: HOSPADM

## 2025-05-05 RX ORDER — SODIUM CHLORIDE 9 MG/ML
INJECTION, SOLUTION INTRAVENOUS PRN
Status: DISCONTINUED | OUTPATIENT
Start: 2025-05-05 | End: 2025-05-08 | Stop reason: HOSPADM

## 2025-05-05 RX ORDER — DEXAMETHASONE SODIUM PHOSPHATE 4 MG/ML
INJECTION, SOLUTION INTRA-ARTICULAR; INTRALESIONAL; INTRAMUSCULAR; INTRAVENOUS; SOFT TISSUE
Status: DISCONTINUED | OUTPATIENT
Start: 2025-05-05 | End: 2025-05-05 | Stop reason: SDUPTHER

## 2025-05-05 RX ORDER — LIDOCAINE HYDROCHLORIDE 10 MG/ML
1 INJECTION, SOLUTION EPIDURAL; INFILTRATION; INTRACAUDAL; PERINEURAL
Status: DISCONTINUED | OUTPATIENT
Start: 2025-05-05 | End: 2025-05-05 | Stop reason: HOSPADM

## 2025-05-05 RX ORDER — ONDANSETRON 2 MG/ML
4 INJECTION INTRAMUSCULAR; INTRAVENOUS
Status: DISCONTINUED | OUTPATIENT
Start: 2025-05-05 | End: 2025-05-05 | Stop reason: HOSPADM

## 2025-05-05 RX ORDER — MIDAZOLAM HYDROCHLORIDE 1 MG/ML
INJECTION, SOLUTION INTRAMUSCULAR; INTRAVENOUS
Status: DISCONTINUED
Start: 2025-05-05 | End: 2025-05-05 | Stop reason: WASHOUT

## 2025-05-05 RX ORDER — GLYCOPYRROLATE 0.2 MG/ML
0.4 INJECTION INTRAMUSCULAR; INTRAVENOUS ONCE
Status: DISCONTINUED | OUTPATIENT
Start: 2025-05-05 | End: 2025-05-05 | Stop reason: HOSPADM

## 2025-05-05 RX ORDER — OXYCODONE HYDROCHLORIDE 5 MG/1
10 TABLET ORAL EVERY 4 HOURS PRN
Status: DISCONTINUED | OUTPATIENT
Start: 2025-05-05 | End: 2025-05-08 | Stop reason: HOSPADM

## 2025-05-05 RX ORDER — PANTOPRAZOLE SODIUM 40 MG/1
40 TABLET, DELAYED RELEASE ORAL
Status: DISCONTINUED | OUTPATIENT
Start: 2025-05-06 | End: 2025-05-08 | Stop reason: HOSPADM

## 2025-05-05 RX ORDER — ONDANSETRON 4 MG/1
4 TABLET, ORALLY DISINTEGRATING ORAL EVERY 8 HOURS PRN
Status: DISCONTINUED | OUTPATIENT
Start: 2025-05-05 | End: 2025-05-08 | Stop reason: HOSPADM

## 2025-05-05 RX ORDER — VASOPRESSIN 20 [USP'U]/ML
INJECTION, SOLUTION INTRAMUSCULAR; SUBCUTANEOUS
Status: DISCONTINUED | OUTPATIENT
Start: 2025-05-05 | End: 2025-05-05 | Stop reason: SDUPTHER

## 2025-05-05 RX ORDER — DIPHENHYDRAMINE HYDROCHLORIDE 50 MG/ML
12.5 INJECTION, SOLUTION INTRAMUSCULAR; INTRAVENOUS
Status: DISCONTINUED | OUTPATIENT
Start: 2025-05-05 | End: 2025-05-05 | Stop reason: HOSPADM

## 2025-05-05 RX ORDER — METOCLOPRAMIDE HYDROCHLORIDE 5 MG/ML
10 INJECTION INTRAMUSCULAR; INTRAVENOUS
Status: DISCONTINUED | OUTPATIENT
Start: 2025-05-05 | End: 2025-05-05 | Stop reason: HOSPADM

## 2025-05-05 RX ORDER — NALOXONE HYDROCHLORIDE 0.4 MG/ML
INJECTION, SOLUTION INTRAMUSCULAR; INTRAVENOUS; SUBCUTANEOUS PRN
Status: DISCONTINUED | OUTPATIENT
Start: 2025-05-05 | End: 2025-05-05 | Stop reason: HOSPADM

## 2025-05-05 RX ORDER — SODIUM CHLORIDE 0.9 % (FLUSH) 0.9 %
5-40 SYRINGE (ML) INJECTION EVERY 12 HOURS SCHEDULED
Status: DISCONTINUED | OUTPATIENT
Start: 2025-05-05 | End: 2025-05-05 | Stop reason: HOSPADM

## 2025-05-05 RX ORDER — HYDRALAZINE HYDROCHLORIDE 20 MG/ML
10 INJECTION INTRAMUSCULAR; INTRAVENOUS
Status: DISCONTINUED | OUTPATIENT
Start: 2025-05-05 | End: 2025-05-05 | Stop reason: HOSPADM

## 2025-05-05 RX ORDER — SODIUM CHLORIDE 9 MG/ML
INJECTION, SOLUTION INTRAVENOUS PRN
Status: DISCONTINUED | OUTPATIENT
Start: 2025-05-05 | End: 2025-05-05 | Stop reason: HOSPADM

## 2025-05-05 RX ORDER — FENTANYL CITRATE 50 UG/ML
INJECTION, SOLUTION INTRAMUSCULAR; INTRAVENOUS
Status: DISCONTINUED
Start: 2025-05-05 | End: 2025-05-05 | Stop reason: WASHOUT

## 2025-05-05 RX ORDER — FENTANYL CITRATE 50 UG/ML
INJECTION, SOLUTION INTRAMUSCULAR; INTRAVENOUS
Status: DISCONTINUED | OUTPATIENT
Start: 2025-05-05 | End: 2025-05-05 | Stop reason: SDUPTHER

## 2025-05-05 RX ORDER — LABETALOL HYDROCHLORIDE 5 MG/ML
10 INJECTION, SOLUTION INTRAVENOUS
Status: DISCONTINUED | OUTPATIENT
Start: 2025-05-05 | End: 2025-05-05 | Stop reason: HOSPADM

## 2025-05-05 RX ORDER — ALBUMIN HUMAN 50 G/1000ML
SOLUTION INTRAVENOUS
Status: COMPLETED
Start: 2025-05-05 | End: 2025-05-05

## 2025-05-05 RX ORDER — LIDOCAINE HYDROCHLORIDE 10 MG/ML
INJECTION, SOLUTION EPIDURAL; INFILTRATION; INTRACAUDAL; PERINEURAL
Status: DISCONTINUED | OUTPATIENT
Start: 2025-05-05 | End: 2025-05-05 | Stop reason: SDUPTHER

## 2025-05-05 RX ORDER — OXYCODONE AND ACETAMINOPHEN 5; 325 MG/1; MG/1
1 TABLET ORAL
Status: DISCONTINUED | OUTPATIENT
Start: 2025-05-05 | End: 2025-05-05 | Stop reason: HOSPADM

## 2025-05-05 RX ORDER — OXYCODONE AND ACETAMINOPHEN 5; 325 MG/1; MG/1
2 TABLET ORAL
Status: DISCONTINUED | OUTPATIENT
Start: 2025-05-05 | End: 2025-05-05 | Stop reason: HOSPADM

## 2025-05-05 RX ORDER — ONDANSETRON 2 MG/ML
INJECTION INTRAMUSCULAR; INTRAVENOUS
Status: DISCONTINUED | OUTPATIENT
Start: 2025-05-05 | End: 2025-05-05 | Stop reason: SDUPTHER

## 2025-05-05 RX ORDER — MEPERIDINE HYDROCHLORIDE 50 MG/ML
12.5 INJECTION INTRAMUSCULAR; INTRAVENOUS; SUBCUTANEOUS EVERY 5 MIN PRN
Status: DISCONTINUED | OUTPATIENT
Start: 2025-05-05 | End: 2025-05-05 | Stop reason: HOSPADM

## 2025-05-05 RX ADMIN — Medication 200 MCG: at 14:35

## 2025-05-05 RX ADMIN — SODIUM CHLORIDE, PRESERVATIVE FREE 10 ML: 5 INJECTION INTRAVENOUS at 11:43

## 2025-05-05 RX ADMIN — Medication 200 MCG: at 14:21

## 2025-05-05 RX ADMIN — HYDROMORPHONE HYDROCHLORIDE 1 MG: 1 INJECTION, SOLUTION INTRAMUSCULAR; INTRAVENOUS; SUBCUTANEOUS at 02:04

## 2025-05-05 RX ADMIN — GABAPENTIN 100 MG: 100 CAPSULE ORAL at 10:57

## 2025-05-05 RX ADMIN — ATORVASTATIN CALCIUM 40 MG: 40 TABLET, FILM COATED ORAL at 10:57

## 2025-05-05 RX ADMIN — VASOPRESSIN 2 UNITS: 20 INJECTION INTRAVENOUS at 14:39

## 2025-05-05 RX ADMIN — DEXAMETHASONE SODIUM PHOSPHATE 8 MG: 4 INJECTION INTRA-ARTICULAR; INTRALESIONAL; INTRAMUSCULAR; INTRAVENOUS; SOFT TISSUE at 14:19

## 2025-05-05 RX ADMIN — Medication 200 MCG: at 14:25

## 2025-05-05 RX ADMIN — ALBUMIN (HUMAN) 500 ML: 12.5 INJECTION, SOLUTION INTRAVENOUS at 14:44

## 2025-05-05 RX ADMIN — GABAPENTIN 100 MG: 100 CAPSULE ORAL at 21:39

## 2025-05-05 RX ADMIN — FENTANYL CITRATE 50 MCG: 50 INJECTION, SOLUTION INTRAMUSCULAR; INTRAVENOUS at 14:41

## 2025-05-05 RX ADMIN — SODIUM CHLORIDE: 0.9 INJECTION, SOLUTION INTRAVENOUS at 16:31

## 2025-05-05 RX ADMIN — HYDROMORPHONE HYDROCHLORIDE 0.5 MG: 1 INJECTION, SOLUTION INTRAMUSCULAR; INTRAVENOUS; SUBCUTANEOUS at 16:10

## 2025-05-05 RX ADMIN — Medication 200 MCG: at 14:30

## 2025-05-05 RX ADMIN — LIDOCAINE HYDROCHLORIDE 50 MG: 10 INJECTION, SOLUTION EPIDURAL; INFILTRATION; INTRACAUDAL; PERINEURAL at 14:11

## 2025-05-05 RX ADMIN — Medication 2000 MG: at 14:17

## 2025-05-05 RX ADMIN — HYDROMORPHONE HYDROCHLORIDE 1 MG: 1 INJECTION, SOLUTION INTRAMUSCULAR; INTRAVENOUS; SUBCUTANEOUS at 05:39

## 2025-05-05 RX ADMIN — FENTANYL CITRATE 50 MCG: 50 INJECTION, SOLUTION INTRAMUSCULAR; INTRAVENOUS at 14:11

## 2025-05-05 RX ADMIN — SODIUM CHLORIDE, SODIUM LACTATE, POTASSIUM CHLORIDE, AND CALCIUM CHLORIDE: .6; .31; .03; .02 INJECTION, SOLUTION INTRAVENOUS at 11:43

## 2025-05-05 RX ADMIN — ONDANSETRON 4 MG: 2 INJECTION, SOLUTION INTRAMUSCULAR; INTRAVENOUS at 15:08

## 2025-05-05 RX ADMIN — ACETAMINOPHEN 650 MG: 325 TABLET ORAL at 19:32

## 2025-05-05 RX ADMIN — PROPOFOL 150 MG: 10 INJECTION, EMULSION INTRAVENOUS at 14:11

## 2025-05-05 RX ADMIN — SODIUM CHLORIDE: 0.9 INJECTION, SOLUTION INTRAVENOUS at 11:00

## 2025-05-05 RX ADMIN — HYDROMORPHONE HYDROCHLORIDE 0.5 MG: 1 INJECTION, SOLUTION INTRAMUSCULAR; INTRAVENOUS; SUBCUTANEOUS at 15:45

## 2025-05-05 RX ADMIN — SODIUM CHLORIDE, PRESERVATIVE FREE 10 ML: 5 INJECTION INTRAVENOUS at 21:39

## 2025-05-05 RX ADMIN — HYDROMORPHONE HYDROCHLORIDE 1 MG: 1 INJECTION, SOLUTION INTRAMUSCULAR; INTRAVENOUS; SUBCUTANEOUS at 09:43

## 2025-05-05 RX ADMIN — SODIUM CHLORIDE, PRESERVATIVE FREE 10 ML: 5 INJECTION INTRAVENOUS at 09:46

## 2025-05-05 ASSESSMENT — PAIN DESCRIPTION - LOCATION
LOCATION: HIP

## 2025-05-05 ASSESSMENT — PAIN DESCRIPTION - ORIENTATION
ORIENTATION: LEFT

## 2025-05-05 ASSESSMENT — PAIN SCALES - GENERAL
PAINLEVEL_OUTOF10: 2
PAINLEVEL_OUTOF10: 7
PAINLEVEL_OUTOF10: 7
PAINLEVEL_OUTOF10: 3
PAINLEVEL_OUTOF10: 7
PAINLEVEL_OUTOF10: 8
PAINLEVEL_OUTOF10: 5
PAINLEVEL_OUTOF10: 7
PAINLEVEL_OUTOF10: 6
PAINLEVEL_OUTOF10: 8
PAINLEVEL_OUTOF10: 8
PAINLEVEL_OUTOF10: 6

## 2025-05-05 ASSESSMENT — PAIN DESCRIPTION - DESCRIPTORS
DESCRIPTORS: ACHING

## 2025-05-05 ASSESSMENT — PAIN DESCRIPTION - PAIN TYPE
TYPE: SURGICAL PAIN

## 2025-05-05 ASSESSMENT — PAIN - FUNCTIONAL ASSESSMENT
PAIN_FUNCTIONAL_ASSESSMENT: NONE - DENIES PAIN
PAIN_FUNCTIONAL_ASSESSMENT: PREVENTS OR INTERFERES WITH MANY ACTIVE NOT PASSIVE ACTIVITIES
PAIN_FUNCTIONAL_ASSESSMENT: ACTIVITIES ARE NOT PREVENTED
PAIN_FUNCTIONAL_ASSESSMENT: 0-10

## 2025-05-05 ASSESSMENT — PAIN SCALES - WONG BAKER
WONGBAKER_NUMERICALRESPONSE: HURTS A LITTLE BIT
WONGBAKER_NUMERICALRESPONSE: NO HURT

## 2025-05-05 NOTE — ED NOTES
Report given to BALDOMERO cleveland from justa thomas.   Report method by phone   The following was reviewed with receiving RN:   Current vital signs:  /70   Pulse (!) 105   Temp 97.9 °F (36.6 °C) (Oral)   Resp 12   Ht 1.803 m (5' 11\")   Wt 84.4 kg (186 lb)   SpO2 96%   BMI 25.94 kg/m²                      Any medication or safety alerts were reviewed. Any pending diagnostics and notifications were also reviewed, as well as any safety concerns or issues, abnormal labs, abnormal imaging, and abnormal assessment findings. Questions were answered.

## 2025-05-05 NOTE — PROGRESS NOTES
Physical Therapy        Physical Therapy Cancel Note      DATE: 2025    NAME: Eliceo Madden  MRN: [id number]   : 1945      Patient not seen this date for Physical Therapy due to:    Pt with (+) L hip fx. Plan is ORIF today at noon. Will continue to pursue PT eval post operatively as appropriate.       Electronically signed by NEGAR AUGUSTINE PT on 2025 at 10:39 AM

## 2025-05-05 NOTE — ANESTHESIA POSTPROCEDURE EVALUATION
Department of Anesthesiology  Postprocedure Note    Patient: Eliceo Madden  MRN: [id number]  YOB: 1945  Date of evaluation: 5/5/2025    Procedure Summary       Date: 05/05/25 Room / Location: [address]    Anesthesia Start: 1408 Anesthesia Stop: 1526    Procedure: LEFT HIP TFNA WITH SYNTHES (Left) Diagnosis:       Open fracture dislocation of left hip (HCC)      (Open fracture dislocation of left hip (HCC) [S72.002B])    Surgeons: Kumar Kahn MD Responsible Provider: Dario Mark MD    Anesthesia Type: general ASA Status: 3            Anesthesia Type: No value filed.    Charlene Phase I: Charlene Score: 8    Charlene Phase II:      Anesthesia Post Evaluation    Patient location during evaluation: PACU  Patient participation: complete - patient participated  Level of consciousness: awake and awake and alert  Pain score: 4  Nausea & Vomiting: no nausea and no vomiting  Cardiovascular status: hemodynamically stable and blood pressure returned to baseline  Respiratory status: acceptable  Hydration status: euvolemic  Multimodal analgesia pain management approach  Pain management: adequate    No notable events documented.

## 2025-05-05 NOTE — PROGRESS NOTES
[facility]  Office: [phone]  Jonah Boston, DO, Vahe Dupont, DO, Naveed Callejas DO, Hammad Jenkins, DO, Federico Díaz MD, Lauren Benjamin MD, Georges Le MD, Babita Mccann MD,  Gurdeep Barcenas MD, Sabas Rai MD, Altagracia Santillan MD,  Patrick Barnett DO, Jerrell Dawson MD, Farzad Nelson MD, Sung Boston DO, Kesha Luna MD,  Nick Lopez DO, Glenis Ceballos MD, Natty Begum MD, Era Olivares MD,  Jakob Seaman MD, Dharmesh Hill MD, Naomy Esparza MD, Tamie Jensen MD, Agustín Taylor MD, Torin Olmedo MD, Elian Martínez, DO, Arline Galvez MD, David Philippe MD, Patrick Benítez MD, Mohsin Reza, MD, Arturo Stafford MD, Shirley Waterhouse, CNP,  Kristin Siddiqui, CNP, Elian Zhao, CNP,  Chio Ortega, DNP, Bethany Mendez, CNP, Ann Oro, CNP, Aniya Tran, CNP, Nandini Pena, CNP, Taina Le, PA-C, Keren Gonzalez, CNP, Francisco Dela Cruz, CNP,  Elma James, CNP, Yudi Dwyer, CNP, Jakob Redmond, PA-C, Chrissy Sweet, CNP,  Precious Galindo, CNS, Sabrina Sin, CNP, Charlotte Thomson, CNP,   Rylee Abarca, CNP         [facility]   IN-PATIENT SERVICE   [facility]    Progress Note    5/5/2025    4:03 PM    Name:   Eliceo Madden  MRN:     [id number]     Acct:      [id number]   Room:   [facility] RM/NONE  IP Day:  1  Admit Date:  5/4/2025  5:37 PM    PCP:   Sal Gifford DO  Code Status:  Full Code    Subjective:     C/C:   Chief Complaint   Patient presents with    Hip Pain     Patient reports getting up out of chair - turned too quickly and lost balance falling and injuring L hip.      Interval History Status: not changed.     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

## 2025-05-05 NOTE — OP NOTE
Operative Note      Patient: Eliceo Madden  YOB: 1945  MRN: [id number]    Date of Procedure: 5/5/2025    Pre-op diagnosis: Left hip intertrochanteric hip fracture    Post-Op Diagnosis: Same       Procedure(s):  LEFT HIP TFNA WITH SYNTHES    Surgeon(s):  Kumar Kahn MD    Assistant:   Nancie Joy CNP    Anesthesia: General    Estimated Blood Loss (mL): Minimal    Complications: None    Specimens:   * No specimens in log *    Implants:  Implant Name Type Inv. Item Serial No.  Lot No. LRB No. Used Action   NAIL IM L200MM [id number] 125DEG SHT PROX FEM GRN TI MARK - [id number]  NAIL IM L200MM [id number] 125DEG SHT PROX FEM GRN TI MARK  DEPUY SYNTHES [employer]WD [id number] Left 1 Implanted   SCREW BNE L100MM DIA10.35MM G TI MARK PERF FOR PROX FEM - [id number]  SCREW BNE L100MM DIA10.35MM G TI MARK PERF FOR PROX FEM  DEPUY SYNTHES [employer]-WD [id number] Left 1 Implanted   SCREW LK F/IM NAIL 5X38MM XL25 SILE - [id number]  SCREW LK F/IM NAIL 5X38MM XL25 SILE  DEPUY SYNTHES [employer]-WD [id number] Left 1 Implanted         Drains: * No LDAs found *    Findings:  Infection Present At Time Of Surgery (PATOS) (choose all levels that have infection present):  No infection present  Other Findings:     Detailed Description of Procedure:   Informed consent was obtained.  I marked his left hip.  He was brought back to the operating room where general anesthesia was mL induced.  The left leg was prepped and draped in normal sterile fashion.  The timeout was performed.  He received prophylactic antibiotics.  Prior to prepping and draping a reduction maneuver was performed and x-rays confirmed anatomic reduction.  I gained entrance to the femoral canal at the appropriate location on AP and lateral x-rays.  I used the opening reamer.  I sunk the nail to the appropriate depth.  I placed the femoral head screw.  I applied compression across the fracture and locked this and proximally.  I placed the distal interlock screw.  I took final

## 2025-05-05 NOTE — ANESTHESIA PRE PROCEDURE
Department of Anesthesiology  Preprocedure Note       Name:  Eliceo Madden   Age:  80 y.o.  :  1945                                          MRN:  [id number]         Date:  2025      Surgeon: Surgeon(s):  Kumar Kahn MD    Procedure: Procedure(s):  LEFT HIP TFNA WITH SYNTHES    Medications prior to admission:   Prior to Admission medications    Medication Sig Start Date End Date Taking? Authorizing Provider   spironolactone (ALDACTONE) 50 MG tablet Take 1 tablet by mouth daily 24  Yes Agustín Taylor MD   apixaban (ELIQUIS) 5 MG TABS tablet Take 1 tablet by mouth 2 times daily   Yes Eliza Gallegos MD   levothyroxine (SYNTHROID) 75 MCG tablet Take 1 tablet by mouth Daily   Yes ProviderEliza MD   vitamin D (ERGOCALCIFEROL) 1.25 MG (29856 UT) CAPS capsule Take 1 capsule by mouth once a week 23  Yes Georgette Ramos APRN - CNP   midodrine (PROAMATINE) 5 MG tablet Take 1 tablet by mouth 3 times daily   Yes ProviderEliza MD   gabapentin (NEURONTIN) 100 MG capsule Take 1 capsule by mouth 3 times daily.   Yes Eliza Gallegos MD   Coenzyme Q10 (COQ-10) 100 MG CAPS Take 1 capsule by mouth daily   Yes ProviderEliza MD   omeprazole 20 MG EC tablet Take 1 tablet by mouth daily   Yes ProviderEliza MD   ONDANSETRON HCL PO Take 1 mg by mouth every 8 hours as needed for Nausea or Vomiting   Yes Eliza Gallegos MD   Multiple Vitamin (MULTI VITAMIN) TABS Take by mouth   Yes ProviderEliza MD   ATORVASTATIN CALCIUM PO Take 40 mg by mouth daily   Yes Eliza Gallegos MD   furosemide (LASIX) 40 MG tablet Take 1 tablet by mouth daily  Patient not taking: Reported on 2025   Agustín Taylor MD   tamsulosin (FLOMAX) 0.4 MG capsule Take 1 capsule by mouth daily  Patient not taking: Reported on 2025    Eliza Gallegos MD   senna (SENOKOT) 8.6 MG tablet Take 1 tablet by mouth as needed for Constipation  Patient not taking:

## 2025-05-05 NOTE — CARE COORDINATION
Case Management Assessment  Initial Evaluation    Date/Time of Evaluation: 5/5/2025 9:43 AM  Assessment Completed by: Cassie Beaver RN    If patient is discharged prior to next notation, then this note serves as note for discharge by case management.    Patient Name: Eliceo Madden                   YOB: 1945  Diagnosis: Closed fracture of left hip, initial encounter ([facility]) [S72.002A]  Fall from standing, initial encounter [W19.XXXA]  Hip fracture requiring operative repair, left, closed, initial encounter ([facility]) [S72.002A]                   Date / Time: 5/4/2025  5:37 PM    Patient Admission Status: Inpatient   Readmission Risk (Low < 19, Mod (19-27), High > 27): Readmission Risk Score: 16.2    Current PCP: Sal Gifford, DO  PCP verified by CM? Yes    Chart Reviewed: Yes      History Provided by: Patient  Patient Orientation: Alert and Oriented    Patient Cognition: Alert    Hospitalization in the last 30 days (Readmission):  No    If yes, Readmission Assessment in  Navigator will be completed.    Advance Directives:      Code Status: Full Code   Patient's Primary Decision Maker is: Legal Next of Kin    Primary Decision Maker: Mima Madden - Unknown - [phone]    Discharge Planning:    Patient lives with: Spouse/Significant Other Type of Home: House  Primary Care Giver: Self  Patient Support Systems include: Spouse/Significant Other   Current Financial resources: Medicare  Current community resources: None  Current services prior to admission: Durable Medical Equipment            Current DME: Cane, Walker            Type of Home Care services:  None    ADLS  Prior functional level: Independent in ADLs/IADLs  Current functional level: Other (see comment) (Await post op PT/OT eval)    PT AM-PAC:   /24  OT AM-PAC:   /24    Family can provide assistance at DC: Yes  Would you like Case Management to discuss the discharge plan with any other family members/significant others, and if so, who?

## 2025-05-05 NOTE — PROGRESS NOTES
Occupational Therapy    [facility]  Occupational Therapy Not Seen Note    DATE: 2025    NAME: Eliceo Madden  MRN: [id number]   : 1945      Patient not seen this date for Occupational Therapy due to:    Pt with (+) L hip fx. Plan is ORIF today at noon. Will continue to pursue OT eval post operatively as appropriate.     Electronically signed by DEYANIRA DAVILA OTR/L on 2025 at 10:41 AM

## 2025-05-05 NOTE — PLAN OF CARE
Problem: Chronic Conditions and Co-morbidities  Goal: Patient's chronic conditions and co-morbidity symptoms are monitored and maintained or improved  Outcome: Progressing  Flowsheets (Taken 5/5/2025 0023 by Tiana Villanueva, RN)  Care Plan - Patient's Chronic Conditions and Co-Morbidity Symptoms are Monitored and Maintained or Improved:   Monitor and assess patient's chronic conditions and comorbid symptoms for stability, deterioration, or improvement   Collaborate with multidisciplinary team to address chronic and comorbid conditions and prevent exacerbation or deterioration   Update acute care plan with appropriate goals if chronic or comorbid symptoms are exacerbated and prevent overall improvement and discharge     Problem: Skin/Tissue Integrity  Goal: Skin integrity remains intact  Description: 1.  Monitor for areas of redness and/or skin breakdown2.  Assess vascular access sites hourly3.  Every 4-6 hours minimum:  Change oxygen saturation probe site4.  Every 4-6 hours:  If on nasal continuous positive airway pressure, respiratory therapy assess nares and determine need for appliance change or resting period  Outcome: Progressing     Problem: Safety - Adult  Goal: Free from fall injury  Outcome: Progressing     Problem: Discharge Planning  Goal: Discharge to home or other facility with appropriate resources  Outcome: Progressing  Flowsheets (Taken 5/5/2025 0023 by Tiana Villanueva, RN)  Discharge to home or other facility with appropriate resources:   Identify barriers to discharge with patient and caregiver   Arrange for needed discharge resources and transportation as appropriate   Identify discharge learning needs (meds, wound care, etc)   Refer to discharge planning if patient needs post-hospital services based on physician order or complex needs related to functional status, cognitive ability or social support system     Problem: Pain  Goal: Verbalizes/displays adequate comfort level or baseline comfort

## 2025-05-05 NOTE — H&P
Collection Time: 05/04/25  6:01 PM   Result Value Ref Range    Sodium 137 135 - 144 mmol/L    Potassium 4.0 3.7 - 5.3 mmol/L    Chloride 103 98 - 107 mmol/L    CO2 24 20 - 31 mmol/L    Anion Gap 10 9 - 17 mmol/L    Glucose 115 (H) 70 - 99 mg/dL    BUN 40 (H) 8 - 23 mg/dL    Creatinine 1.1 0.7 - 1.2 mg/dL    Est, Glom Filt Rate 67 >60 mL/min/1.73m2    Calcium 8.0 (L) 8.6 - 10.4 mg/dL    Total Protein 5.5 (L) 6.4 - 8.3 g/dL    Albumin 3.5 3.5 - 5.2 g/dL    Albumin/Globulin Ratio 1.8 1.0 - 2.5    Total Bilirubin 0.3 0.3 - 1.2 mg/dL    Alkaline Phosphatase 85 40 - 129 U/L    ALT 16 5 - 41 U/L    AST 24 <40 U/L   Protime-INR    Collection Time: 05/04/25  6:01 PM   Result Value Ref Range    Protime 15.7 (H) 11.8 - 14.6 sec    INR 1.3 (H) 0.9 - 1.2   APTT    Collection Time: 05/04/25  6:01 PM   Result Value Ref Range    APTT 28.7 24.0 - 36.0 sec   Urinalysis    Collection Time: 05/04/25  8:28 PM   Result Value Ref Range    Color, UA Yellow Yellow    Turbidity UA Clear Clear    Glucose, Ur NEGATIVE NEGATIVE mg/dL    Bilirubin, Urine NEGATIVE NEGATIVE    Ketones, Urine NEGATIVE NEGATIVE mg/dL    Specific Gravity, UA <1.005 (L) 1.005 - 1.030    Urine Hgb NEGATIVE NEGATIVE    pH, Urine 6.0 5.0 - 8.0    Protein, UA NEGATIVE NEGATIVE mg/dL    Urobilinogen, Urine Normal 0.0 - 1.0 EU/dL    Nitrite, Urine NEGATIVE NEGATIVE    Leukocyte Esterase, Urine NEGATIVE NEGATIVE    Comment       Microscopic exam not performed based on chemical results unless requested in original order.       Imaging/Diagnostics:    CT HEAD WO CONTRAST  Result Date: 5/4/2025  1. No acute intracranial abnormality. 2. No acute osseous abnormality of the cervical spine.     CT CERVICAL SPINE WO CONTRAST  Result Date: 5/4/2025  1. No acute intracranial abnormality. 2. No acute osseous abnormality of the cervical spine.     XR CHEST PORTABLE  Result Date: 5/4/2025  No acute process.     XR HIP LEFT (2-3 VIEWS)  Result Date: 5/4/2025  Nondisplaced  intertrochanteric fracture with moderate varus angulation. Suspected nondisplaced fracture of left inferior pubic ramus.       Assessment :      Hospital Problems           Last Modified POA    * (Principal) Hip fracture requiring operative repair, left, closed, initial encounter ([facility]) 5/4/2025 Yes    Mixed hyperlipidemia (Chronic) 5/4/2025 Yes    Diabetes mellitus type 2, diet-controlled ([facility]) (Chronic) 5/4/2025 Yes    PAF (paroxysmal atrial fibrillation) ([facility]) 5/4/2025 Yes    Idiopathic hypotension 5/4/2025 Yes    Hypothyroidism 5/4/2025 Yes       Plan:     Patient status inpatient in the Med/Surge    Closed fracture of left hip  N.p.o.  Pain management  Bedrest  Hold anticoagulations  Consult to orthopedic surgery, surgery tomorrow  Fall  Fall precautions in place  Patient will need PT OT  Hypothyroidism  Resume home Synthroid  Hypotension  Resume home midodrine  Paroxysmal atrial fibrillation  Resume home amiodarone, spironolactone and furosemide  Diabetes mellitus  Insulin sliding scale  Hypoglycemia protocol  7.  Hyperlipidemia   1.  Resume home atorvastatin    Consultations:   IP CONSULT TO ORTHOPEDIC SURGERY     Patient is admitted as inpatient status because of co-morbidities listed above, severity of signs and symptoms as outlined, requirement for current medical therapies and most importantly because of direct risk to patient if care not provided in a hospital setting.  Expected length of stay > 48 hours.    JEANNETTE Ocampo CNP  5/4/2025  10:05 PM    Copy sent to Sal Robles DO

## 2025-05-05 NOTE — CONSULTS
[facility] ORTHOPAEDICS CONSULTATION    Reason for consult  Left hip pain    HPI / Chief Complaint  Eliceo Madden is a 80 y.o. old male who had a fall at home.  He came to the emergency department was noted to have a left hip fracture.  He was admitted for medical and surgical evaluation.  He is on Eliquis for atrial fibrillation.    Past Medical History  Eliceo  has a past medical history of A-fib (HCC), Bowel obstruction (HCC), BPH (benign prostatic hyperplasia), CHF (congestive heart failure) (HCC), Diffuse large B cell lymphoma (HCC), and Hyperlipidemia.    Past Surgical History  Eliceo  has a past surgical history that includes Cholecystectomy; Vasectomy; Pancreas Biopsy; and Colonoscopy (03/2017).    Current Medications  Reviewed. See EMR for details.    Allergies  Allergies have been reviewed.  Eliceo is allergic to no known allergies.    Social History  Eliceo  reports that he quit smoking about 53 years ago. His smoking use included cigarettes. He has never used smokeless tobacco. He reports that he does not drink alcohol and does not use drugs.    Family History  Eliceo's family history includes Alzheimer's Disease in his mother; Cancer in his father; Coronary Art Dis in his paternal grandfather.      Review of Systems   History obtained from the patient.   Constitution: no fever or chills  Musculoskeletal: As noted in the HPI     Physical Exam  /64   Pulse (!) 113   Temp 97.3 °F (36.3 °C) (Infrared)   Resp 10   Ht 1.803 m (5' 11\")   Wt 89.9 kg (198 lb 3.1 oz)   SpO2 95%   BMI 27.64 kg/m²   General Appearance: alert, well appearing, and in no distress  Mental Status: alert, oriented to person, place, and time  He has good range of motion of the bilateral upper extremities with no deformity and no pain.  The right leg has no pain with logroll, no effusion at the knee, no tenderness to palpation of the knee, dorsiflexion and plantarflexion is intact.  The left leg exam of

## 2025-05-05 NOTE — PLAN OF CARE
Problem: Chronic Conditions and Co-morbidities  Goal: Patient's chronic conditions and co-morbidity symptoms are monitored and maintained or improved  Outcome: Progressing  Flowsheets (Taken 5/5/2025 0900)  Care Plan - Patient's Chronic Conditions and Co-Morbidity Symptoms are Monitored and Maintained or Improved:   Monitor and assess patient's chronic conditions and comorbid symptoms for stability, deterioration, or improvement   Collaborate with multidisciplinary team to address chronic and comorbid conditions and prevent exacerbation or deterioration   Update acute care plan with appropriate goals if chronic or comorbid symptoms are exacerbated and prevent overall improvement and discharge     Problem: Skin/Tissue Integrity  Goal: Skin integrity remains intact  Description: 1.  Monitor for areas of redness and/or skin breakdown2.  Assess vascular access sites hourly3.  Every 4-6 hours minimum:  Change oxygen saturation probe site4.  Every 4-6 hours:  If on nasal continuous positive airway pressure, respiratory therapy assess nares and determine need for appliance change or resting period  Outcome: Progressing  Flowsheets (Taken 5/5/2025 0900)  Skin Integrity Remains Intact:   Monitor for areas of redness and/or skin breakdown   Assess vascular access sites hourly   Assess need for specialty bed   Turn and reposition as indicated   Positioning devices     Problem: Safety - Adult  Goal: Free from fall injury  Outcome: Progressing     Problem: Discharge Planning  Goal: Discharge to home or other facility with appropriate resources  Outcome: Progressing  Flowsheets (Taken 5/5/2025 0900)  Discharge to home or other facility with appropriate resources:   Identify barriers to discharge with patient and caregiver   Arrange for needed discharge resources and transportation as appropriate   Identify discharge learning needs (meds, wound care, etc)   Refer to discharge planning if patient needs post-hospital services

## 2025-05-06 LAB
ANION GAP SERPL CALCULATED.3IONS-SCNC: 7 MMOL/L (ref 9–17)
BASOPHILS # BLD: 0 K/UL (ref 0–0.2)
BASOPHILS NFR BLD: 0 % (ref 0–2)
BUN SERPL-MCNC: 30 MG/DL (ref 8–23)
CALCIUM SERPL-MCNC: 7.9 MG/DL (ref 8.6–10.4)
CHLORIDE SERPL-SCNC: 109 MMOL/L (ref 98–107)
CO2 SERPL-SCNC: 22 MMOL/L (ref 20–31)
CREAT SERPL-MCNC: 1.1 MG/DL (ref 0.7–1.2)
EOSINOPHIL # BLD: 0 K/UL (ref 0–0.4)
EOSINOPHILS RELATIVE PERCENT: 0 % (ref 1–4)
ERYTHROCYTE [DISTWIDTH] IN BLOOD BY AUTOMATED COUNT: 35 % (ref 12.5–15.4)
GFR, ESTIMATED: 67 ML/MIN/1.73M2
GLUCOSE SERPL-MCNC: 167 MG/DL (ref 70–99)
HCT VFR BLD AUTO: 21.9 % (ref 41–53)
HCT VFR BLD AUTO: 25.6 % (ref 41–53)
HGB BLD-MCNC: 6.7 G/DL (ref 13.5–17.5)
HGB BLD-MCNC: 8.1 G/DL (ref 13.5–17.5)
LYMPHOCYTES NFR BLD: 0.12 K/UL (ref 1–4.8)
LYMPHOCYTES RELATIVE PERCENT: 2 % (ref 24–44)
MCH RBC QN AUTO: 26.3 PG (ref 26–34)
MCHC RBC AUTO-ENTMCNC: 30.9 G/DL (ref 31–37)
MCV RBC AUTO: 85.2 FL (ref 80–100)
MONOCYTES NFR BLD: 0.12 K/UL (ref 0.1–0.8)
MONOCYTES NFR BLD: 2 % (ref 1–7)
MORPHOLOGY: ABNORMAL
MORPHOLOGY: ABNORMAL
NEUTROPHILS NFR BLD: 96 % (ref 36–66)
NEUTS SEG NFR BLD: 5.56 K/UL (ref 1.8–7.7)
PLATELET # BLD AUTO: 121 K/UL (ref 140–450)
PMV BLD AUTO: 7.2 FL (ref 6–12)
POTASSIUM SERPL-SCNC: 5.1 MMOL/L (ref 3.7–5.3)
RBC # BLD AUTO: 2.57 M/UL (ref 4.5–5.9)
SODIUM SERPL-SCNC: 138 MMOL/L (ref 135–144)
WBC OTHER # BLD: 5.8 K/UL (ref 3.5–11)

## 2025-05-06 PROCEDURE — 97162 PT EVAL MOD COMPLEX 30 MIN: CPT

## 2025-05-06 PROCEDURE — 6370000000 HC RX 637 (ALT 250 FOR IP)

## 2025-05-06 PROCEDURE — 97110 THERAPEUTIC EXERCISES: CPT

## 2025-05-06 PROCEDURE — 30233N1 TRANSFUSION OF NONAUTOLOGOUS RED BLOOD CELLS INTO PERIPHERAL VEIN, PERCUTANEOUS APPROACH: ICD-10-PCS | Performed by: INTERNAL MEDICINE

## 2025-05-06 PROCEDURE — 86901 BLOOD TYPING SEROLOGIC RH(D): CPT

## 2025-05-06 PROCEDURE — 6370000000 HC RX 637 (ALT 250 FOR IP): Performed by: STUDENT IN AN ORGANIZED HEALTH CARE EDUCATION/TRAINING PROGRAM

## 2025-05-06 PROCEDURE — 2500000003 HC RX 250 WO HCPCS: Performed by: NURSE PRACTITIONER

## 2025-05-06 PROCEDURE — 85018 HEMOGLOBIN: CPT

## 2025-05-06 PROCEDURE — 97116 GAIT TRAINING THERAPY: CPT

## 2025-05-06 PROCEDURE — 1200000000 HC SEMI PRIVATE

## 2025-05-06 PROCEDURE — P9016 RBC LEUKOCYTES REDUCED: HCPCS

## 2025-05-06 PROCEDURE — 86923 COMPATIBILITY TEST ELECTRIC: CPT

## 2025-05-06 PROCEDURE — 36415 COLL VENOUS BLD VENIPUNCTURE: CPT

## 2025-05-06 PROCEDURE — 6370000000 HC RX 637 (ALT 250 FOR IP): Performed by: NURSE PRACTITIONER

## 2025-05-06 PROCEDURE — 99232 SBSQ HOSP IP/OBS MODERATE 35: CPT | Performed by: INTERNAL MEDICINE

## 2025-05-06 PROCEDURE — 80048 BASIC METABOLIC PNL TOTAL CA: CPT

## 2025-05-06 PROCEDURE — 86850 RBC ANTIBODY SCREEN: CPT

## 2025-05-06 PROCEDURE — 86900 BLOOD TYPING SEROLOGIC ABO: CPT

## 2025-05-06 PROCEDURE — 97535 SELF CARE MNGMENT TRAINING: CPT

## 2025-05-06 PROCEDURE — 85014 HEMATOCRIT: CPT

## 2025-05-06 PROCEDURE — 85025 COMPLETE CBC W/AUTO DIFF WBC: CPT

## 2025-05-06 PROCEDURE — 36430 TRANSFUSION BLD/BLD COMPNT: CPT

## 2025-05-06 PROCEDURE — 97166 OT EVAL MOD COMPLEX 45 MIN: CPT

## 2025-05-06 RX ORDER — SODIUM CHLORIDE 9 MG/ML
INJECTION, SOLUTION INTRAVENOUS PRN
Status: DISCONTINUED | OUTPATIENT
Start: 2025-05-06 | End: 2025-05-08 | Stop reason: HOSPADM

## 2025-05-06 RX ORDER — METOPROLOL TARTRATE 25 MG/1
25 TABLET, FILM COATED ORAL NIGHTLY
Status: ON HOLD | COMMUNITY
End: 2025-05-07 | Stop reason: HOSPADM

## 2025-05-06 RX ADMIN — PANTOPRAZOLE SODIUM 40 MG: 40 TABLET, DELAYED RELEASE ORAL at 06:26

## 2025-05-06 RX ADMIN — LEVOTHYROXINE SODIUM 75 MCG: 75 TABLET ORAL at 06:26

## 2025-05-06 RX ADMIN — ATORVASTATIN CALCIUM 40 MG: 40 TABLET, FILM COATED ORAL at 09:11

## 2025-05-06 RX ADMIN — SODIUM CHLORIDE, PRESERVATIVE FREE 10 ML: 5 INJECTION INTRAVENOUS at 20:47

## 2025-05-06 RX ADMIN — MIDODRINE HYDROCHLORIDE 10 MG: 5 TABLET ORAL at 17:59

## 2025-05-06 RX ADMIN — ACETAMINOPHEN 650 MG: 325 TABLET ORAL at 04:37

## 2025-05-06 RX ADMIN — GABAPENTIN 100 MG: 100 CAPSULE ORAL at 20:40

## 2025-05-06 RX ADMIN — ACETAMINOPHEN 650 MG: 325 TABLET ORAL at 12:51

## 2025-05-06 RX ADMIN — SODIUM CHLORIDE, PRESERVATIVE FREE 10 ML: 5 INJECTION INTRAVENOUS at 09:11

## 2025-05-06 RX ADMIN — GABAPENTIN 100 MG: 100 CAPSULE ORAL at 09:11

## 2025-05-06 RX ADMIN — GABAPENTIN 100 MG: 100 CAPSULE ORAL at 14:10

## 2025-05-06 RX ADMIN — APIXABAN 5 MG: 5 TABLET, FILM COATED ORAL at 09:11

## 2025-05-06 RX ADMIN — ACETAMINOPHEN 650 MG: 325 TABLET ORAL at 22:01

## 2025-05-06 RX ADMIN — POLYETHYLENE GLYCOL 3350 17 G: 17 POWDER, FOR SOLUTION ORAL at 18:20

## 2025-05-06 RX ADMIN — MIDODRINE HYDROCHLORIDE 10 MG: 5 TABLET ORAL at 14:10

## 2025-05-06 RX ADMIN — MIDODRINE HYDROCHLORIDE 10 MG: 5 TABLET ORAL at 09:11

## 2025-05-06 RX ADMIN — APIXABAN 5 MG: 5 TABLET, FILM COATED ORAL at 20:40

## 2025-05-06 ASSESSMENT — PAIN SCALES - GENERAL
PAINLEVEL_OUTOF10: 1
PAINLEVEL_OUTOF10: 2
PAINLEVEL_OUTOF10: 0
PAINLEVEL_OUTOF10: 1
PAINLEVEL_OUTOF10: 3
PAINLEVEL_OUTOF10: 3
PAINLEVEL_OUTOF10: 0

## 2025-05-06 ASSESSMENT — PAIN DESCRIPTION - ORIENTATION
ORIENTATION: LEFT

## 2025-05-06 ASSESSMENT — PAIN DESCRIPTION - DESCRIPTORS
DESCRIPTORS: ACHING

## 2025-05-06 ASSESSMENT — PAIN SCALES - WONG BAKER
WONGBAKER_NUMERICALRESPONSE: NO HURT
WONGBAKER_NUMERICALRESPONSE: HURTS A LITTLE BIT

## 2025-05-06 ASSESSMENT — PAIN DESCRIPTION - LOCATION
LOCATION: HIP

## 2025-05-06 NOTE — CONSENT
Informed Consent for Blood Component Transfusion Note    I have discussed with the patient the rationale for blood component transfusion; its benefits in treating or preventing fatigue, organ damage, or death; and its risk which includes mild transfusion reactions, rare risk of blood borne infection, or more serious but rare reactions. I have discussed the alternatives to transfusion, including the risk and consequences of not receiving transfusion. The patient had an opportunity to ask questions and had agreed to proceed with transfusion of blood components.    Electronically signed by Arline Galvez MD on 5/6/25 at 8:17 AM EDT

## 2025-05-06 NOTE — PROGRESS NOTES
Physical Therapy        Physical Therapy Cancel Note      DATE: 2025    NAME: Eliceo Madden  MRN: [id number]   : 1945      Patient not seen this date for Physical Therapy due to:    Pt's post op hgb is 6.7. Plan for transfusion. Will continue to pursue PT eval as pt more medically appropriate.       Electronically signed by NEGAR AUGUSTINE, PT on 2025 at 10:09 AM

## 2025-05-06 NOTE — PROGRESS NOTES
Occupational Therapy  Occupational Therapy Initial Evaluation  Facility/Department: [address]   Patient Name: Eliceo Madden        MRN: [id number]    : 1945    Date of Service: 2025    Chief Complaint   Patient presents with    Hip Pain     Patient reports getting up out of chair - turned too quickly and lost balance falling and injuring L hip.    2025 Underwent L hip ORIF by Dr Kahn.    Past Medical History:  has a past medical history of A-fib (HCC), Bowel obstruction (HCC), BPH (benign prostatic hyperplasia), CHF (congestive heart failure) (HCC), Diffuse large B cell lymphoma (HCC), and Hyperlipidemia.  Past Surgical History:  has a past surgical history that includes Cholecystectomy; Vasectomy; Pancreas Biopsy; Colonoscopy (2017); and Femur Surgery (Left, 2025).    Discharge Recommendations  Discharge Recommendations: Patient would benefit from continued therapy after discharge  OT Equipment Recommendations  Other: continue to assess/monitor    Assessment  Performance deficits / Impairments: Decreased functional mobility ;Decreased ADL status;Decreased balance;Decreased safe awareness  Assessment: Patient demonstrated decreased ADLs, balance and functional mobility following hospitalization due to fall, (+) L hip IT fracture, underwent L hip ORIF by Dr Kahn. Patient prior to admit was independent with ADLs/IADLs and ambulated either with cane in community or no device in home. At this time patient is completing bed mob MOD, functional transfers with MIN-MOD x2, ambulating with MIN x2 using RW and SBA-DEP for ADLs. Patient would benefit from skilled OT services addressing above deficits while here at hospital and following discharge to maximize independence. Currently patient has not demonstrated ability to safely return to prior living arrangements.  Prognosis: Good  Decision Making: Medium Complexity  REQUIRES OT FOLLOW-UP: Yes  Activity Tolerance  Activity Tolerance: Patient limited by  rails  Entrance Stairs - Number of Steps: 2  Entrance Stairs - Rails: Left  Bathroom Shower/Tub: Walk-in shower  Bathroom Toilet: Handicap height  Bathroom Equipment: Grab bars in shower  Home Equipment: Cane;Walker - Rolling  Has the patient had two or more falls in the past year or any fall with injury in the past year?: Yes  Prior Level of Assist for ADLs: Independent  Prior Level of Assist for Homemaking: Independent (share with spouse)  Prior Level of Assist for Ambulation: Independent household ambulator, with or without device;Independent community ambulator, with or without device (no device in apt, cane in community)  Prior Level of Assist for Transfers: Independent  Active : Yes  Mode of Transportation: VFA  Occupation: Retired  Type of Occupation:  35 years  Leisure & Hobbies: spend time with wife    Vision/Hearing  Vision  Vision: Within Functional Limits  Hearing  Hearing: Exceptions to WFL  Hearing Exceptions: Hard of hearing/hearing concerns;Bilateral hearing aid    BUE Assessment  Gross Assessment  AROM: Within functional limits  Strength: Within functional limits  Coordination: Within functional limits  Hand Dominance: Right     Objective  Orientation  Overall Orientation Status: Within Normal Limits  Orientation Level: Oriented X4  Cognition  Overall Cognitive Status: Exceptions  Arousal/Alertness: Appears intact  Following Commands: Follows multistep commands with increased time;Follows multistep commands with repetition  Attention Span: Appears intact  Memory: Appears intact  Safety Judgement: Decreased awareness of need for safety  Problem Solving: Assistance required to generate solutions;Assistance required to identify errors made  Insights: Decreased awareness of deficits  Initiation: Appears intact  Sequencing: Requires cues for some    Activities of Daily Living  Feeding: Setup  Grooming: Stand by assistance  Grooming Skilled Clinical Factors: sitting in recliner to brush

## 2025-05-06 NOTE — PLAN OF CARE
Problem: Chronic Conditions and Co-morbidities  Goal: Patient's chronic conditions and co-morbidity symptoms are monitored and maintained or improved  Outcome: Progressing  Flowsheets (Taken 5/6/2025 0730)  Care Plan - Patient's Chronic Conditions and Co-Morbidity Symptoms are Monitored and Maintained or Improved: Monitor and assess patient's chronic conditions and comorbid symptoms for stability, deterioration, or improvement     Problem: Skin/Tissue Integrity  Goal: Skin integrity remains intact  Description: 1.  Monitor for areas of redness and/or skin breakdown2.  Assess vascular access sites hourly3.  Every 4-6 hours minimum:  Change oxygen saturation probe site4.  Every 4-6 hours:  If on nasal continuous positive airway pressure, respiratory therapy assess nares and determine need for appliance change or resting period  Outcome: Progressing  Flowsheets (Taken 5/6/2025 0730)  Skin Integrity Remains Intact: Monitor for areas of redness and/or skin breakdown     Problem: Safety - Adult  Goal: Free from fall injury  Outcome: Progressing  Flowsheets (Taken 5/6/2025 1654)  Free From Fall Injury: Instruct family/caregiver on patient safety     Problem: Discharge Planning  Goal: Discharge to home or other facility with appropriate resources  Outcome: Progressing  Flowsheets  Taken 5/6/2025 1654  Discharge to home or other facility with appropriate resources: Identify barriers to discharge with patient and caregiver  Taken 5/6/2025 0730  Discharge to home or other facility with appropriate resources: Identify barriers to discharge with patient and caregiver     Problem: Pain  Goal: Verbalizes/displays adequate comfort level or baseline comfort level  Outcome: Progressing  Flowsheets (Taken 5/6/2025 1035)  Verbalizes/displays adequate comfort level or baseline comfort level: Encourage patient to monitor pain and request assistance

## 2025-05-06 NOTE — PROGRESS NOTES
Occupational Therapy    [facility]  Occupational Therapy Not Seen Note    DATE: 2025    NAME: Eliceo Madden  MRN: [id number]   : 1945      Patient not seen this date for Occupational Therapy due to:    Patient Hgb 6.7, transfusion. Will defer therapy eval until medically stable.    Electronically signed by DEYANIRA DAVILA OTR/L on 2025 at 10:00 AM

## 2025-05-06 NOTE — PROGRESS NOTES
Physical Therapy  Facility/Department: [address]   Physical Therapy Initial Evaluation    Patient Name: Eliceo Madden        MRN: [id number]    : 1945    Date of Service: 2025    Chief Complaint   Patient presents with    Hip Pain     Patient reports getting up out of chair - turned too quickly and lost balance falling and injuring L hip.      Past Medical History:  has a past medical history of A-fib (HCC), Bowel obstruction (HCC), BPH (benign prostatic hyperplasia), CHF (congestive heart failure) (HCC), Diffuse large B cell lymphoma (HCC), and Hyperlipidemia.  Past Surgical History:  has a past surgical history that includes Cholecystectomy; Vasectomy; Pancreas Biopsy; Colonoscopy (2017); and Femur Surgery (Left, 2025).    Discharge Recommendations  Discharge Recommendations: Therapy recommended at discharge  PT Equipment Recommendations  Equipment Needed: No (rolling walker and cane at home)    Assessment  Body Structures, Functions, Activity Limitations Requiring Skilled Therapeutic Intervention: Decreased functional mobility , Decreased safe awareness, Decreased ADL status, Decreased strength, Decreased posture, Decreased balance, Decreased endurance, Increased pain  Assessment: Pt presents after fall with (+) L hip fx and underwent ORIF by Dr. Kahn on 25. At baseline, pt lives with spouse, ind gait no device (cane in community), ind ADL's. Pt currently requiring mod assist for bed mobility except max for scooting, mod x 2 sit->stand and min x 2 stand->sit, pt ambulated 5ft with rolling walker and min x 2 FWBing on LLE. Pt does not demonstrate adequate safety for return to prior living situation. Pt will benefit from continued skilled PT for strengthening, safety, balance and functional mobility training while in the hospital and at discharge.  Therapy Prognosis: Good  Decision Making: Medium Complexity  Requires PT Follow-Up: Yes  Activity Tolerance  Activity Tolerance: Patient limited  by fatigue, Patient limited by pain, Patient limited by endurance  Safety Devices  Type of Devices: Left in chair, Call light within reach, Chair alarm in place, Gait belt, Nurse notified, Patient at risk for falls  Restraints  Restraints Initially in Place: No    AM-PAC  AM-[facility] Basic Mobility - Inpatient   How much help is needed turning from your back to your side while in a flat bed without using bedrails?: A Lot  How much help is needed moving from lying on your back to sitting on the side of a flat bed without using bedrails?: A Lot  How much help is needed moving to and from a bed to a chair?: Total  How much help is needed standing up from a chair using your arms?: Total  How much help is needed walking in hospital room?: Total  How much help is needed climbing 3-5 steps with a railing?: Total  AM-PAC Inpatient Mobility Raw Score : 8  AM-PAC Inpatient T-Scale Score : 28.52  Mobility Inpatient CMS 0-100% Score: 86.62  Mobility Inpatient CMS G-Code Modifier : CM    Restrictions/Precautions  Restrictions/Precautions  Restrictions/Precautions: Fall Risk, Weight Bearing, Bed Alarm, General Precautions  Required Braces or Orthoses?: No  Lower Extremity Weight Bearing Restrictions  Left Lower Extremity Weight Bearing: Weight Bearing As Tolerated  Position Activity Restriction  Other Position/Activity Restrictions: 5/5 L hip ORIF Dr Kahn, WBAT L EZE, EUGENIO       Subjective  General  Patient assessed for rehabilitation services?: Yes  Additional Pertinent Hx: lymphoma, R ankle fx with ORIF 8/2024  Family/Caregiver Present: Yes (spouse)  Follows Commands: Within Functional Limits  Other (Comment): Pt is [facility]  General  General Comments: Admit 5/4 after LOB and fall, (+) L hip fx. On 5/5 underwent L hip ORIF by Dr. Kahn. Post op anemia at 6.7, received transfusion  Subjective  Subjective: Pt reporting left hip pain as 3/10 during exs and 4/10 during gait. Pt left up in recliner, ice placed on left hip and pt positioned for

## 2025-05-06 NOTE — PLAN OF CARE
Problem: Chronic Conditions and Co-morbidities  Goal: Patient's chronic conditions and co-morbidity symptoms are monitored and maintained or improved  5/6/2025 0008 by Zollinger, Sheri, RN  Outcome: Progressing  5/5/2025 1740 by Usha Grimm LPN  Outcome: Progressing  Flowsheets (Taken 5/5/2025 0900)  Care Plan - Patient's Chronic Conditions and Co-Morbidity Symptoms are Monitored and Maintained or Improved:   Monitor and assess patient's chronic conditions and comorbid symptoms for stability, deterioration, or improvement   Collaborate with multidisciplinary team to address chronic and comorbid conditions and prevent exacerbation or deterioration   Update acute care plan with appropriate goals if chronic or comorbid symptoms are exacerbated and prevent overall improvement and discharge     Problem: Skin/Tissue Integrity  Goal: Skin integrity remains intact  Description: 1.  Monitor for areas of redness and/or skin breakdown2.  Assess vascular access sites hourly3.  Every 4-6 hours minimum:  Change oxygen saturation probe site4.  Every 4-6 hours:  If on nasal continuous positive airway pressure, respiratory therapy assess nares and determine need for appliance change or resting period  5/6/2025 0008 by Zollinger, Sheri, RN  Outcome: Progressing  5/5/2025 1740 by Usha Grimm LPN  Outcome: Progressing  Flowsheets (Taken 5/5/2025 0900)  Skin Integrity Remains Intact:   Monitor for areas of redness and/or skin breakdown   Assess vascular access sites hourly   Assess need for specialty bed   Turn and reposition as indicated   Positioning devices     Problem: Safety - Adult  Goal: Free from fall injury  5/6/2025 0008 by Zollinger, Sheri, RN  Outcome: Progressing  5/5/2025 1740 by Usha Grimm LPN  Outcome: Progressing     Problem: Discharge Planning  Goal: Discharge to home or other facility with appropriate resources  5/6/2025 0008 by Zollinger, Sheri, RN  Outcome: Progressing  5/5/2025 1740 by Usha Grimm

## 2025-05-06 NOTE — PROGRESS NOTES
[facility]  Office: [phone]  Jonah Boston, DO, Vahe Dupont, DO, Naveed Callejas DO, Hammad Jenkins, DO, Federico Díaz MD, Lauren Benjamin MD, Georges Le MD, Babita Mccann MD,  Gurdeep Barcenas MD, Sabas Rai MD, Altagracia Santillan MD,  Patrick Barnett DO, Jerrell Dawson MD, Farzad Nelson MD, Sung Boston DO, Kesha Luna MD,  Nick Lopez DO, Glenis Ceballos MD, Natty Begum MD, Era Olivares MD,  Jakob Seaman MD, Dharmesh Hill MD, Naomy Esparza MD, Tamie Jensen MD, Agustín Taylor MD, Torin Olmedo MD, Elian Martínez, DO, Arline Galvez MD, David Philippe MD, Patrick Benítez MD, Mohsin Reza, MD, Arturo Stafford MD, Shirley Waterhouse, CNP,  Kristin Siddiqui, CNP, Elian Zhao, CNP,  Chio Ortega, DNP, Bethany Mendez, CNP, Ann Oro, CNP, Aniya Tran, CNP, Nandini Pena, CNP, Tiana Le, PA-C, Keren Gonzalez, CNP, Francisco Dela Cruz, CNP,  Elma James, CNP, Yudi Dwyer, CNP, Jakob Redmond, PA-C, Chrissy Sweet, CNP,  Precious Galindo, CNS, Sabrina Sin, CNP, Charlotte Thomson, CNP,   Rylee Abarca, CNP         [facility]   IN-PATIENT SERVICE   [facility]    Progress Note    5/6/2025    11:14 AM    Name:   Eliceo Madden  MRN:     [id number]     Acct:      [id number]   Room:   315/315-01   Day:  2  Admit Date:  5/4/2025  5:37 PM    PCP:   Sal Gifford DO  Code Status:  Full Code    Subjective:     C/C:   Chief Complaint   Patient presents with    Hip Pain     Patient reports getting up out of chair - turned too quickly and lost balance falling and injuring L hip.      Interval History Status:   Patient feeling much better, tachycardia has resolved  Hemoglobin dropped to 6.7-1 unit transfusion ordered.  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

## 2025-05-06 NOTE — CARE COORDINATION
Spoke with Nelly at [facility] - she relates she will be able to take pt if needed.( If pt agreeable ) Will touch base in AM and start Precert in AM if needed.

## 2025-05-07 LAB
ABO/RH: NORMAL
ANION GAP SERPL CALCULATED.3IONS-SCNC: 10 MMOL/L (ref 9–16)
ANTIBODY SCREEN: NEGATIVE
ARM BAND NUMBER: NORMAL
BASOPHILS # BLD: 0 K/UL (ref 0–0.2)
BASOPHILS NFR BLD: 0 % (ref 0–2)
BLOOD BANK BLOOD PRODUCT EXPIRATION DATE: NORMAL
BLOOD BANK DISPENSE STATUS: NORMAL
BLOOD BANK ISBT PRODUCT BLOOD TYPE: 6200
BLOOD BANK PRODUCT CODE: NORMAL
BLOOD BANK SAMPLE EXPIRATION: NORMAL
BLOOD BANK UNIT TYPE AND RH: NORMAL
BPU ID: NORMAL
BUN SERPL-MCNC: 25 MG/DL (ref 8–23)
CALCIUM SERPL-MCNC: 8.3 MG/DL (ref 8.6–10.4)
CHLORIDE SERPL-SCNC: 108 MMOL/L (ref 98–107)
CO2 SERPL-SCNC: 23 MMOL/L (ref 20–31)
COMPONENT: NORMAL
CREAT SERPL-MCNC: 1 MG/DL (ref 0.7–1.2)
CROSSMATCH RESULT: NORMAL
EOSINOPHIL # BLD: 0.07 K/UL (ref 0–0.4)
EOSINOPHILS RELATIVE PERCENT: 1 % (ref 1–4)
ERYTHROCYTE [DISTWIDTH] IN BLOOD BY AUTOMATED COUNT: 34 % (ref 12.5–15.4)
GFR, ESTIMATED: 76 ML/MIN/1.73M2
GLUCOSE SERPL-MCNC: 94 MG/DL (ref 74–99)
HCT VFR BLD AUTO: 25.1 % (ref 41–53)
HGB BLD-MCNC: 7.9 G/DL (ref 13.5–17.5)
LYMPHOCYTES NFR BLD: 0.22 K/UL (ref 1–4.8)
LYMPHOCYTES RELATIVE PERCENT: 3 % (ref 24–44)
MCH RBC QN AUTO: 27 PG (ref 26–34)
MCHC RBC AUTO-ENTMCNC: 31.7 G/DL (ref 31–37)
MCV RBC AUTO: 85.4 FL (ref 80–100)
MONOCYTES NFR BLD: 0.65 K/UL (ref 0.1–1.2)
MONOCYTES NFR BLD: 9 % (ref 2–11)
MORPHOLOGY: ABNORMAL
MORPHOLOGY: ABNORMAL
NEUTROPHILS NFR BLD: 87 % (ref 36–66)
NEUTS SEG NFR BLD: 6.26 K/UL (ref 1.8–7.7)
PLATELET # BLD AUTO: 126 K/UL (ref 140–450)
PMV BLD AUTO: 7.1 FL (ref 6–12)
POTASSIUM SERPL-SCNC: 4.2 MMOL/L (ref 3.7–5.3)
RBC # BLD AUTO: 2.94 M/UL (ref 4.5–5.9)
SODIUM SERPL-SCNC: 141 MMOL/L (ref 136–145)
TRANSFUSION STATUS: NORMAL
UNIT DIVISION: 0
UNIT ISSUE DATE/TIME: NORMAL
WBC OTHER # BLD: 7.2 K/UL (ref 3.5–11)

## 2025-05-07 PROCEDURE — 6370000000 HC RX 637 (ALT 250 FOR IP): Performed by: NURSE PRACTITIONER

## 2025-05-07 PROCEDURE — 2580000003 HC RX 258: Performed by: INTERNAL MEDICINE

## 2025-05-07 PROCEDURE — 99232 SBSQ HOSP IP/OBS MODERATE 35: CPT | Performed by: INTERNAL MEDICINE

## 2025-05-07 PROCEDURE — 97110 THERAPEUTIC EXERCISES: CPT

## 2025-05-07 PROCEDURE — 1200000000 HC SEMI PRIVATE

## 2025-05-07 PROCEDURE — 6370000000 HC RX 637 (ALT 250 FOR IP): Performed by: STUDENT IN AN ORGANIZED HEALTH CARE EDUCATION/TRAINING PROGRAM

## 2025-05-07 PROCEDURE — 36415 COLL VENOUS BLD VENIPUNCTURE: CPT

## 2025-05-07 PROCEDURE — 97535 SELF CARE MNGMENT TRAINING: CPT

## 2025-05-07 PROCEDURE — 2500000003 HC RX 250 WO HCPCS: Performed by: NURSE PRACTITIONER

## 2025-05-07 PROCEDURE — 6370000000 HC RX 637 (ALT 250 FOR IP)

## 2025-05-07 PROCEDURE — 80048 BASIC METABOLIC PNL TOTAL CA: CPT

## 2025-05-07 PROCEDURE — 85025 COMPLETE CBC W/AUTO DIFF WBC: CPT

## 2025-05-07 PROCEDURE — 97530 THERAPEUTIC ACTIVITIES: CPT

## 2025-05-07 PROCEDURE — 6370000000 HC RX 637 (ALT 250 FOR IP): Performed by: INTERNAL MEDICINE

## 2025-05-07 RX ORDER — FLUDROCORTISONE ACETATE 0.1 MG/1
0.1 TABLET ORAL DAILY
COMMUNITY

## 2025-05-07 RX ORDER — 0.9 % SODIUM CHLORIDE 0.9 %
500 INTRAVENOUS SOLUTION INTRAVENOUS ONCE
Status: COMPLETED | OUTPATIENT
Start: 2025-05-07 | End: 2025-05-07

## 2025-05-07 RX ORDER — SPIRONOLACTONE 25 MG/1
25 TABLET ORAL DAILY
Qty: 30 TABLET | Refills: 3 | Status: SHIPPED | OUTPATIENT
Start: 2025-05-07

## 2025-05-07 RX ORDER — OXYCODONE HYDROCHLORIDE 5 MG/1
5 TABLET ORAL EVERY 6 HOURS PRN
Qty: 12 TABLET | Refills: 0 | Status: SHIPPED | OUTPATIENT
Start: 2025-05-07 | End: 2025-05-10

## 2025-05-07 RX ORDER — MIDODRINE HYDROCHLORIDE 5 MG/1
10 TABLET ORAL 4 TIMES DAILY
Status: DISCONTINUED | OUTPATIENT
Start: 2025-05-07 | End: 2025-05-08 | Stop reason: HOSPADM

## 2025-05-07 RX ORDER — AMIODARONE HYDROCHLORIDE 100 MG/1
100 TABLET ORAL DAILY
Qty: 30 TABLET | Refills: 2 | Status: SHIPPED | OUTPATIENT
Start: 2025-05-07

## 2025-05-07 RX ADMIN — SODIUM CHLORIDE 500 ML: 0.9 INJECTION, SOLUTION INTRAVENOUS at 14:04

## 2025-05-07 RX ADMIN — ATORVASTATIN CALCIUM 40 MG: 40 TABLET, FILM COATED ORAL at 09:35

## 2025-05-07 RX ADMIN — LEVOTHYROXINE SODIUM 75 MCG: 75 TABLET ORAL at 06:15

## 2025-05-07 RX ADMIN — GABAPENTIN 100 MG: 100 CAPSULE ORAL at 09:34

## 2025-05-07 RX ADMIN — MIDODRINE HYDROCHLORIDE 10 MG: 5 TABLET ORAL at 06:15

## 2025-05-07 RX ADMIN — PANTOPRAZOLE SODIUM 40 MG: 40 TABLET, DELAYED RELEASE ORAL at 06:15

## 2025-05-07 RX ADMIN — MIDODRINE HYDROCHLORIDE 10 MG: 5 TABLET ORAL at 16:40

## 2025-05-07 RX ADMIN — MIDODRINE HYDROCHLORIDE 10 MG: 5 TABLET ORAL at 12:27

## 2025-05-07 RX ADMIN — SODIUM CHLORIDE, PRESERVATIVE FREE 10 ML: 5 INJECTION INTRAVENOUS at 09:35

## 2025-05-07 RX ADMIN — ACETAMINOPHEN 650 MG: 325 TABLET ORAL at 13:49

## 2025-05-07 RX ADMIN — GABAPENTIN 100 MG: 100 CAPSULE ORAL at 13:49

## 2025-05-07 RX ADMIN — ACETAMINOPHEN 650 MG: 325 TABLET ORAL at 06:33

## 2025-05-07 RX ADMIN — APIXABAN 5 MG: 5 TABLET, FILM COATED ORAL at 09:35

## 2025-05-07 RX ADMIN — SODIUM CHLORIDE, PRESERVATIVE FREE 10 ML: 5 INJECTION INTRAVENOUS at 21:58

## 2025-05-07 RX ADMIN — GABAPENTIN 100 MG: 100 CAPSULE ORAL at 21:58

## 2025-05-07 RX ADMIN — ACETAMINOPHEN 650 MG: 325 TABLET ORAL at 23:14

## 2025-05-07 RX ADMIN — APIXABAN 5 MG: 5 TABLET, FILM COATED ORAL at 21:58

## 2025-05-07 ASSESSMENT — PAIN DESCRIPTION - ORIENTATION
ORIENTATION: LEFT

## 2025-05-07 ASSESSMENT — PAIN DESCRIPTION - DESCRIPTORS
DESCRIPTORS: ACHING
DESCRIPTORS: ACHING;DISCOMFORT
DESCRIPTORS: ACHING

## 2025-05-07 ASSESSMENT — PAIN SCALES - WONG BAKER
WONGBAKER_NUMERICALRESPONSE: HURTS A LITTLE BIT
WONGBAKER_NUMERICALRESPONSE: NO HURT
WONGBAKER_NUMERICALRESPONSE: NO HURT

## 2025-05-07 ASSESSMENT — PAIN SCALES - GENERAL
PAINLEVEL_OUTOF10: 4
PAINLEVEL_OUTOF10: 3
PAINLEVEL_OUTOF10: 0
PAINLEVEL_OUTOF10: 0
PAINLEVEL_OUTOF10: 4
PAINLEVEL_OUTOF10: 1

## 2025-05-07 ASSESSMENT — PAIN DESCRIPTION - LOCATION
LOCATION: HIP
LOCATION: HIP
LOCATION: LEG

## 2025-05-07 NOTE — PROGRESS NOTES
Occupational Therapy  Occupational Therapy Daily Treatment Note  Facility/Department: [address]   Patient Name: Eliceo Madden        MRN: [id number]    : 1945    Date of Service: 2025    Chief Complaint   Patient presents with    Hip Pain     Patient reports getting up out of chair - turned too quickly and lost balance falling and injuring L hip.      Past Medical History:  has a past medical history of A-fib (HCC), Bowel obstruction (HCC), BPH (benign prostatic hyperplasia), CHF (congestive heart failure) (HCC), Diffuse large B cell lymphoma (HCC), and Hyperlipidemia.  Past Surgical History:  has a past surgical history that includes Cholecystectomy; Vasectomy; Pancreas Biopsy; Colonoscopy (2017); and Femur Surgery (Left, 2025).    Discharge Recommendations  Discharge Recommendations: Patient would benefit from continued therapy after discharge       Assessment  Performance deficits / Impairments: Decreased functional mobility ;Decreased ADL status;Decreased balance;Decreased safe awareness  Assessment: Pt exhibits decreased ADL function, activity tolerance & transfers/functional mobility following L hip IT fracture. Pt was formerly independent with ADLs/IADLs however pt is currently MAX A x 2 for sit<>stand rtansfers and MAX A for bed mobility. Pt was limited by pain (3-4 at rest, 6-7 w/activity) and lightheadedness with low BP (70/47 when standing and 91/59 when supine). Pt would benefit from further OT during hospitalization to prevent further deconditioning and promote return to PLOF. Pt is not safe to return to previous living environment and would benefit from 24-hour assist and further OT following discharge.  Prognosis: Good  REQUIRES OT FOLLOW-UP: Yes  Activity Tolerance  Activity Tolerance: Patient limited by pain  Activity Tolerance Comments: Pt also limited by lightheadedness, low BP (70/47 following sit>stand, 91/59 following sitting to supine). RN notified.  Safety Devices  Type of

## 2025-05-07 NOTE — PROGRESS NOTES
Physician Progress Note      PATIENT:               ORTEGA VELASCO  CSN #:                  [id number]  :                       1945  ADMIT DATE:       2025 5:37 PM  DISCH DATE:  RESPONDING  PROVIDER #:        Arline Galvez MD          QUERY TEXT:    Anemia is documented in the medical record  per medicine. Please specify   the type:    The clinical indicators include:  Presented with Femur fracture . Per review HGB 9.2>6.7. anemia documented in   medicine progress notes on . transfusing 1 unit of prbc  Options provided:  -- Related to acute blood loss  -- Related to acute on chronic blood loss  -- Other - I will add my own diagnosis  -- Disagree - Not applicable / Not valid  -- Disagree - Clinically unable to determine / Unknown  -- Refer to Clinical Documentation Reviewer    PROVIDER RESPONSE TEXT:    The patient's anemia is related to acute blood loss.    Query created by: Sury Liu on 2025 1:03 PM      Electronically signed by:  Arline Galvez MD 2025 10:57 AM

## 2025-05-07 NOTE — CARE COORDINATION
Cm spoke with Prosper @ [facility] they can accept.  Patient agreeable CM will start precert. \    1715  Authorization rec;d  # [id number]  5/8 to 5/14

## 2025-05-07 NOTE — PROGRESS NOTES
[facility]  Office: [phone]  Jonah Boston, DO, Vahe Dupont, DO, Naveed aCllejas DO, Hammad Jenkins, DO, Federico Díaz MD, Lauren Benjamin MD, Georges Le MD, Babita Mccann MD,  Gurdeep Barcenas MD, Sabas Rai MD, Altagracia Santillan MD,  Patrick Barnett DO, Jerrell Dawson MD, Farzad Nelson MD, Sung Boston DO, Kesha Luna MD,  Nick Lopez DO, Glenis Ceballos MD, Natty Begum MD, Era Olivares MD,  Jakob Seaman MD, Dharmesh Hill MD, Naomy Esparza MD, Tamie Jensen MD, Agustín Taylor MD, Torin Olmedo MD, Elian Martínez, DO, Arline Galvez MD, David Philippe MD, Patrick Benítez MD, Mohsin Reza, MD, Arturo Stafford MD, Shirley Waterhouse, CNP,  Kristin Siddiqui, CNP, Elian Zhao, CNP,  Chio Ortega, DNP, Bethany Mendez, CNP, Ann Oro, CNP, Aniya Tran, CNP, Nandini Pena, CNP, Tiana Le, PA-C, Keren Gonzalez, CNP, Francisco Dela Cruz, CNP,  Elma James, CNP, Yudi Dwyer, CNP, Jakob Redmond, PA-C, Chrissy Sweet, CNP,  Precious Galindo, CNS, Sabrina Sin, CNP, Charlotte Thomson, CNP,   Rylee Abarca, CNP         [facility]   IN-PATIENT SERVICE   [facility]    Progress Note    5/7/2025    8:49 AM    Name:   Eliceo Madden  MRN:     [id number]     Acct:      [id number]   Room:   315/315-01   Day:  3  Admit Date:  5/4/2025  5:37 PM    PCP:   Sal Gifford DO  Code Status:  Full Code    Subjective:     C/C:   Chief Complaint   Patient presents with    Hip Pain     Patient reports getting up out of chair - turned too quickly and lost balance falling and injuring L hip.      Interval History Status:     Patient continues to feel better  Has mobilized within the room with help of physical therapy  Received 1 unit blood transfusion yesterday, hemoglobin has remained stable since then.  No new issues        Brief History:     80-year-old male with history of essential hypertension, hyperlipidemia, persistent atrial fibrillation, moderate MR/TR  hypothyroidism, liver cirrhosis, chronic hypertension , diffuse large B-cell lymphoma, chronic anemia  Presented with left-sided hip pain after a fall  Found to be tachycardic on arrival hemoglobin 9.2, CT head, cervical spine negative,   hip x-ray left side showed nondisplaced intertrochanteric fracture with moderate varus angulation, suspected nondisplaced fracture of the left inferior pubic ramus    Medications:     Allergies:    Allergies   Allergen Reactions    No Known Allergies        Current Meds:   Scheduled Meds:    pantoprazole  40 mg Oral QAM AC    sodium chloride flush  5-40 mL IntraVENous 2 times per day    apixaban  5 mg Oral BID    atorvastatin  40 mg Oral Daily    [Held by provider] furosemide  20 mg Oral Daily    gabapentin  100 mg Oral TID    levothyroxine  75 mcg Oral Daily    midodrine  10 mg Oral TID    [Held by provider] spironolactone  25 mg Oral Daily    sodium chloride flush  5-40 mL IntraVENous 2 times per day     Continuous Infusions:    sodium chloride      sodium chloride      dextrose      sodium chloride       PRN Meds: sodium chloride, sodium chloride flush, sodium chloride, ondansetron **OR** ondansetron, oxyCODONE **OR** oxyCODONE, glucose, dextrose bolus **OR** dextrose bolus, glucagon (rDNA), dextrose, sodium chloride flush, sodium chloride, potassium chloride **OR** potassium alternative oral replacement **OR** potassium chloride, magnesium sulfate, polyethylene glycol, acetaminophen **OR** acetaminophen, melatonin, HYDROmorphone **OR** HYDROmorphone    Data:     Past Medical History:   has a past medical history of A-fib (HCC), Bowel obstruction (HCC), BPH (benign prostatic hyperplasia), CHF (congestive heart failure) (HCC), Diffuse large B cell lymphoma (HCC), and Hyperlipidemia.    Social History:   reports that he quit smoking about 53 years ago. His smoking use included cigarettes. He has never used smokeless tobacco. He reports that he does not drink alcohol and does not

## 2025-05-07 NOTE — DISCHARGE INSTR - COC
Continuity of Care Form    Patient Name: Eliceo Madden   :  1945  MRN:  [id number]    Admit date:  2025  Discharge date:  25    Code Status Order: Full Code   Advance Directives:    Date/Time Healthcare Directive Type of Healthcare Directive Copy in Chart Healthcare Agent Appointed Healthcare Agent's Name Healthcare Agent's Phone Number    [phone] Yes, patient has an advance directive for healthcare treatment  Living will;Durable power of  for health care  No, copy requested from family  --  --  --             Admitting Physician:  Naomy Samaniego Sra, MD  PCP: Sal Gifford DO    Discharging Nurse: Fer ALEXANDRE  Discharging Hospital Unit/Room#: 315/315-01  Discharging Unit Phone Number: [phone]    Emergency Contact:   Extended Emergency Contact Information  Primary Emergency Contact: Mima Madden  Address: [address] [place] [address]  Home Phone: [phone]  Relation: Unknown    Past Surgical History:  Past Surgical History:   Procedure Laterality Date    CHOLECYSTECTOMY      COLONOSCOPY  2017    Normal per patient    FEMUR SURGERY Left 2025    LEFT HIP TFNA WITH SYNTHES performed by Kumar Kahn MD at [facility] OR    PANCREAS BIOPSY      VASECTOMY         Immunization History:   Immunization History   Administered Date(s) Administered    COVID-19, PFIZER Bivalent, DO NOT Dilute, (age 12y+), IM, 30 mcg/0.3 mL 10/01/2022, 2023    COVID-19, PFIZER GRAY top, DO NOT Dilute, (age 12 y+), IM, 30 mcg/0.3 mL 2022    COVID-19, PFIZER PURPLE top, DILUTE for use, (age 12 y+), 30mcg/0.3mL 2021, 2021, 10/07/2021    COVID-19, PFIZER, , (age 12y+), IM, 30mcg/0.3mL 10/17/2023, 10/15/2024       Active Problems:  Patient Active Problem List   Diagnosis Code    Near syncope R55    Mixed hyperlipidemia E78.2    Mild left atrial enlargement I51.7    Mild right atrial enlargement I51.7    Kidney cysts N28.1

## 2025-05-07 NOTE — PLAN OF CARE
Problem: Chronic Conditions and Co-morbidities  Goal: Patient's chronic conditions and co-morbidity symptoms are monitored and maintained or improved  5/7/2025 1420 by Mellissa Bazzi LPN  Outcome: Progressing  Flowsheets (Taken 5/6/2025 0730)  Care Plan - Patient's Chronic Conditions and Co-Morbidity Symptoms are Monitored and Maintained or Improved: Monitor and assess patient's chronic conditions and comorbid symptoms for stability, deterioration, or improvement  5/7/2025 0309 by Eric Kinney LPN  Outcome: Progressing     Problem: Skin/Tissue Integrity  Goal: Skin integrity remains intact  Description: 1.  Monitor for areas of redness and/or skin breakdown2.  Assess vascular access sites hourly3.  Every 4-6 hours minimum:  Change oxygen saturation probe site4.  Every 4-6 hours:  If on nasal continuous positive airway pressure, respiratory therapy assess nares and determine need for appliance change or resting period  5/7/2025 1420 by Mellissa Bazzi LPN  Outcome: Progressing  Flowsheets (Taken 5/6/2025 0730)  Skin Integrity Remains Intact: Monitor for areas of redness and/or skin breakdown  5/7/2025 0309 by Eric Kinney LPN  Outcome: Progressing     Problem: Safety - Adult  Goal: Free from fall injury  5/7/2025 1420 by Mellissa Bazzi LPN  Outcome: Progressing  Flowsheets (Taken 5/6/2025 1654)  Free From Fall Injury: Instruct family/caregiver on patient safety  5/7/2025 0309 by Eric Kinney LPN  Outcome: Progressing     Problem: Discharge Planning  Goal: Discharge to home or other facility with appropriate resources  5/7/2025 1420 by Mellissa Bazzi LPN  Outcome: Progressing  Flowsheets (Taken 5/6/2025 1654)  Discharge to home or other facility with appropriate resources: Identify barriers to discharge with patient and caregiver  5/7/2025 0309 by Eric Kinney LPN  Outcome: Progressing     Problem: Pain  Goal: Verbalizes/displays adequate comfort level or baseline comfort level  5/7/2025 1420

## 2025-05-07 NOTE — PLAN OF CARE
Problem: Chronic Conditions and Co-morbidities  Goal: Patient's chronic conditions and co-morbidity symptoms are monitored and maintained or improved  5/7/2025 0309 by Eric Kinney LPN  Outcome: Progressing     Problem: Skin/Tissue Integrity  Goal: Skin integrity remains intact  Description: 1.  Monitor for areas of redness and/or skin breakdown2.  Assess vascular access sites hourly3.  Every 4-6 hours minimum:  Change oxygen saturation probe site4.  Every 4-6 hours:  If on nasal continuous positive airway pressure, respiratory therapy assess nares and determine need for appliance change or resting period  5/7/2025 0309 by Eric Kinney LPN  Outcome: Progressing     Problem: Safety - Adult  Goal: Free from fall injury  5/7/2025 0309 by Eric Kinney LPN  Outcome: Progressing     Problem: Discharge Planning  Goal: Discharge to home or other facility with appropriate resources  5/7/2025 0309 by Eric Kinney LPN  Outcome: Progressing     Problem: Pain  Goal: Verbalizes/displays adequate comfort level or baseline comfort level  5/7/2025 0309 by Eric Kinney LPN  Outcome: Progressing

## 2025-05-07 NOTE — PROGRESS NOTES
[facility] Operative Progress Note      Subjective:     He is doing well.  He was able to get up to a chair today.  He did state that he got lightheaded.  He also notes some low blood pressure today.  He got a blood transfusion yesterday.    Objective:     Patient Vitals for the past 24 hrs:   BP Temp Temp src Pulse Resp SpO2   05/07/25 1545 97/63 -- -- -- -- --   05/07/25 0746 (!) 110/57 97.9 °F (36.6 °C) Oral 68 16 99 %   05/07/25 0600 113/63 -- -- -- -- --   05/07/25 0452 (!) 102/51 -- -- 69 -- 98 %   05/07/25 0032 (!) 98/58 -- Oral 75 16 99 %   05/06/25 2042 (!) 111/58 97.9 °F (36.6 °C) Oral 79 17 100 %   05/06/25 1759 (!) 103/58 -- -- -- -- --   05/06/25 1601 (!) 99/50 97.9 °F (36.6 °C) Oral 81 -- 100 %     He is resting comfortably in his bed.  He answers all my questions.  The left hip dressing is clean dry and intact.  Dorsiflexion and plantarflexion is intact.  His calf is soft.    Data Review  CBC:   Recent Labs     05/05/25  0613 05/06/25  0618 05/06/25  1448 05/07/25  0709   WBC 5.6 5.8  --  7.2   HGB 8.7* 6.7* 8.1* 7.9*    121*  --  126*           Assessment:   Patient is S/P ORIF of the left hip 2 days ago.  He is doing well.  He got a blood transfusion yesterday.      Plan:   He is weightbearing as tolerated.  Dressing can stay on for a week and then daily dry dressing as needed.   DVT prophylaxis with his Eliquis.    Hemoglobin seems stable.  I will sign off.  Please call with questions.  I will see him in the office in 2 weeks or we will send an x-ray out to his facility.      Electronically signed by Kumar Kahn MD

## 2025-05-07 NOTE — PROGRESS NOTES
Physical Therapy  Facility/Department: [address]   Physical Therapy Daily Treatment Note    Patient Name: Eliceo Madden        MRN: [id number]    : 1945    Date of Service: 2025    Chief Complaint   Patient presents with    Hip Pain     Patient reports getting up out of chair - turned too quickly and lost balance falling and injuring L hip.      Past Medical History:  has a past medical history of A-fib (HCC), Bowel obstruction (HCC), BPH (benign prostatic hyperplasia), CHF (congestive heart failure) (HCC), Diffuse large B cell lymphoma (HCC), and Hyperlipidemia.  Past Surgical History:  has a past surgical history that includes Cholecystectomy; Vasectomy; Pancreas Biopsy; Colonoscopy (2017); and Femur Surgery (Left, 2025).    Discharge Recommendations  Discharge Recommendations: Therapy recommended at discharge  PT Equipment Recommendations  Equipment Needed: No    Assessment  Body Structures, Functions, Activity Limitations Requiring Skilled Therapeutic Intervention: Decreased functional mobility ;Decreased safe awareness;Decreased ADL status;Decreased strength;Decreased posture;Decreased balance;Decreased endurance;Increased pain  Assessment: Pt presents after fall with (+) L hip fx and underwent ORIF by Dr. Kahn on 25. At baseline, pt lives with spouse, ind gait no device (cane in community), ind ADL's. Pt currently requiring max assist x 2 for bed mobility, max assist x 2 sit->stand at  and in [facility]. Pt returned to bed via [facility] this date due to hypotension. Pt does not demonstrate adequate safety for return to prior living situation. Pt will benefit from continued skilled PT for strengthening, safety, balance and functional mobility training while in the hospital and at discharge.  Therapy Prognosis: Good  Requires PT Follow-Up: Yes  Activity Tolerance  Activity Tolerance: Treatment limited secondary to medical complications  Activity Tolerance Comments: Limited by

## 2025-05-08 VITALS
HEART RATE: 77 BPM | HEIGHT: 71 IN | SYSTOLIC BLOOD PRESSURE: 95 MMHG | DIASTOLIC BLOOD PRESSURE: 65 MMHG | BODY MASS INDEX: 25.99 KG/M2 | RESPIRATION RATE: 18 BRPM | WEIGHT: 185.63 LBS | OXYGEN SATURATION: 98 % | TEMPERATURE: 98.4 F

## 2025-05-08 LAB
ANION GAP SERPL CALCULATED.3IONS-SCNC: 8 MMOL/L (ref 9–16)
BASOPHILS # BLD: 0 K/UL (ref 0–0.2)
BASOPHILS NFR BLD: 0 % (ref 0–2)
BUN SERPL-MCNC: 22 MG/DL (ref 8–23)
CALCIUM SERPL-MCNC: 8.1 MG/DL (ref 8.6–10.4)
CHLORIDE SERPL-SCNC: 108 MMOL/L (ref 98–107)
CO2 SERPL-SCNC: 23 MMOL/L (ref 20–31)
CREAT SERPL-MCNC: 1.1 MG/DL (ref 0.7–1.2)
EOSINOPHIL # BLD: 0 K/UL (ref 0–0.4)
EOSINOPHILS RELATIVE PERCENT: 0 % (ref 1–4)
ERYTHROCYTE [DISTWIDTH] IN BLOOD BY AUTOMATED COUNT: 33.7 % (ref 12.5–15.4)
GFR, ESTIMATED: 67 ML/MIN/1.73M2
GLUCOSE BLD-MCNC: 81 MG/DL (ref 75–110)
GLUCOSE SERPL-MCNC: 87 MG/DL (ref 74–99)
HCT VFR BLD AUTO: 24.9 % (ref 41–53)
HGB BLD-MCNC: 7.8 G/DL (ref 13.5–17.5)
LYMPHOCYTES NFR BLD: 0.13 K/UL (ref 1–4.8)
LYMPHOCYTES RELATIVE PERCENT: 3 % (ref 24–44)
MCH RBC QN AUTO: 27 PG (ref 26–34)
MCHC RBC AUTO-ENTMCNC: 31.4 G/DL (ref 31–37)
MCV RBC AUTO: 86 FL (ref 80–100)
MONOCYTES NFR BLD: 0.38 K/UL (ref 0.1–0.8)
MONOCYTES NFR BLD: 9 % (ref 1–7)
MORPHOLOGY: ABNORMAL
MORPHOLOGY: ABNORMAL
NEUTROPHILS NFR BLD: 88 % (ref 36–66)
NEUTS SEG NFR BLD: 3.69 K/UL (ref 1.8–7.7)
NUCLEATED RED BLOOD CELLS: 1 PER 100 WBC
PLATELET # BLD AUTO: 129 K/UL (ref 140–450)
PMV BLD AUTO: 7.1 FL (ref 6–12)
POTASSIUM SERPL-SCNC: 4.5 MMOL/L (ref 3.7–5.3)
RBC # BLD AUTO: 2.89 M/UL (ref 4.5–5.9)
SODIUM SERPL-SCNC: 139 MMOL/L (ref 136–145)
WBC OTHER # BLD: 4.2 K/UL (ref 3.5–11)

## 2025-05-08 PROCEDURE — 6370000000 HC RX 637 (ALT 250 FOR IP)

## 2025-05-08 PROCEDURE — 82947 ASSAY GLUCOSE BLOOD QUANT: CPT

## 2025-05-08 PROCEDURE — 6370000000 HC RX 637 (ALT 250 FOR IP): Performed by: INTERNAL MEDICINE

## 2025-05-08 PROCEDURE — 6370000000 HC RX 637 (ALT 250 FOR IP): Performed by: STUDENT IN AN ORGANIZED HEALTH CARE EDUCATION/TRAINING PROGRAM

## 2025-05-08 PROCEDURE — 6370000000 HC RX 637 (ALT 250 FOR IP): Performed by: NURSE PRACTITIONER

## 2025-05-08 PROCEDURE — 80048 BASIC METABOLIC PNL TOTAL CA: CPT

## 2025-05-08 PROCEDURE — 36415 COLL VENOUS BLD VENIPUNCTURE: CPT

## 2025-05-08 PROCEDURE — 6360000002 HC RX W HCPCS: Performed by: INTERNAL MEDICINE

## 2025-05-08 PROCEDURE — 2580000003 HC RX 258: Performed by: INTERNAL MEDICINE

## 2025-05-08 PROCEDURE — 2500000003 HC RX 250 WO HCPCS: Performed by: NURSE PRACTITIONER

## 2025-05-08 PROCEDURE — 99239 HOSP IP/OBS DSCHRG MGMT >30: CPT | Performed by: INTERNAL MEDICINE

## 2025-05-08 PROCEDURE — 85025 COMPLETE CBC W/AUTO DIFF WBC: CPT

## 2025-05-08 RX ORDER — FLUDROCORTISONE ACETATE 0.1 MG/1
0.1 TABLET ORAL DAILY
Status: DISCONTINUED | OUTPATIENT
Start: 2025-05-08 | End: 2025-05-08 | Stop reason: HOSPADM

## 2025-05-08 RX ORDER — MIDODRINE HYDROCHLORIDE 10 MG/1
10 TABLET ORAL 4 TIMES DAILY
Qty: 90 TABLET | Refills: 3 | Status: SHIPPED | OUTPATIENT
Start: 2025-05-08

## 2025-05-08 RX ADMIN — ATORVASTATIN CALCIUM 40 MG: 40 TABLET, FILM COATED ORAL at 10:05

## 2025-05-08 RX ADMIN — MIDODRINE HYDROCHLORIDE 10 MG: 5 TABLET ORAL at 06:19

## 2025-05-08 RX ADMIN — ACETAMINOPHEN 650 MG: 325 TABLET ORAL at 07:21

## 2025-05-08 RX ADMIN — APIXABAN 5 MG: 5 TABLET, FILM COATED ORAL at 10:05

## 2025-05-08 RX ADMIN — PANTOPRAZOLE SODIUM 40 MG: 40 TABLET, DELAYED RELEASE ORAL at 06:17

## 2025-05-08 RX ADMIN — GABAPENTIN 100 MG: 100 CAPSULE ORAL at 10:05

## 2025-05-08 RX ADMIN — FLUDROCORTISONE ACETATE 0.1 MG: 0.1 TABLET ORAL at 10:05

## 2025-05-08 RX ADMIN — IRON SUCROSE 200 MG: 20 INJECTION, SOLUTION INTRAVENOUS at 09:50

## 2025-05-08 RX ADMIN — SODIUM CHLORIDE, PRESERVATIVE FREE 10 ML: 5 INJECTION INTRAVENOUS at 09:49

## 2025-05-08 RX ADMIN — MIDODRINE HYDROCHLORIDE 10 MG: 5 TABLET ORAL at 10:05

## 2025-05-08 RX ADMIN — LEVOTHYROXINE SODIUM 75 MCG: 75 TABLET ORAL at 06:17

## 2025-05-08 ASSESSMENT — PAIN SCALES - GENERAL
PAINLEVEL_OUTOF10: 3
PAINLEVEL_OUTOF10: 2

## 2025-05-08 ASSESSMENT — PAIN DESCRIPTION - ORIENTATION: ORIENTATION: LEFT

## 2025-05-08 ASSESSMENT — PAIN DESCRIPTION - DESCRIPTORS: DESCRIPTORS: ACHING

## 2025-05-08 ASSESSMENT — PAIN DESCRIPTION - LOCATION: LOCATION: HIP

## 2025-05-08 NOTE — CARE COORDINATION
Packet with patient -has H&P - AVS/DIDI Script for oxycodone. , 24 hr MAR  to go with [facility]  Report number for nursing [phone]

## 2025-05-08 NOTE — PLAN OF CARE
Problem: Chronic Conditions and Co-morbidities  Goal: Patient's chronic conditions and co-morbidity symptoms are monitored and maintained or improved  5/7/2025 2309 by Zollinger, Sheri, RN  Outcome: Progressing  5/7/2025 1420 by Mellissa Bazzi LPN  Outcome: Progressing  Flowsheets (Taken 5/6/2025 0730)  Care Plan - Patient's Chronic Conditions and Co-Morbidity Symptoms are Monitored and Maintained or Improved: Monitor and assess patient's chronic conditions and comorbid symptoms for stability, deterioration, or improvement     Problem: Skin/Tissue Integrity  Goal: Skin integrity remains intact  Description: 1.  Monitor for areas of redness and/or skin breakdown2.  Assess vascular access sites hourly3.  Every 4-6 hours minimum:  Change oxygen saturation probe site4.  Every 4-6 hours:  If on nasal continuous positive airway pressure, respiratory therapy assess nares and determine need for appliance change or resting period  5/7/2025 2309 by Zollinger, Sheri, RN  Outcome: Progressing  5/7/2025 1420 by Mellissa Bazzi LPN  Outcome: Progressing  Flowsheets (Taken 5/6/2025 0730)  Skin Integrity Remains Intact: Monitor for areas of redness and/or skin breakdown     Problem: Safety - Adult  Goal: Free from fall injury  5/7/2025 2309 by Zollinger, Sheri, RN  Outcome: Progressing  5/7/2025 1420 by Mellissa Bazzi LPN  Outcome: Progressing  Flowsheets (Taken 5/6/2025 1654)  Free From Fall Injury: Instruct family/caregiver on patient safety     Problem: Discharge Planning  Goal: Discharge to home or other facility with appropriate resources  5/7/2025 2309 by Zollinger, Sheri, RN  Outcome: Progressing  5/7/2025 1420 by Mellissa Bazzi LPN  Outcome: Progressing  Flowsheets (Taken 5/6/2025 1654)  Discharge to home or other facility with appropriate resources: Identify barriers to discharge with patient and caregiver     Problem: Pain  Goal: Verbalizes/displays adequate comfort level or baseline comfort level  5/7/2025

## 2025-05-08 NOTE — PROGRESS NOTES
05/08/25 1250   Encounter Summary   Service Provided For Patient   Last Encounter  05/08/25   Spiritual/Emotional needs   Type Spiritual Support  (Volunteer)   Assessment/Intervention/Outcome   Intervention Prayer (assurance of)/[place]

## 2025-05-08 NOTE — PROGRESS NOTES
Discharge instructions reviewed. All questions answered. IV removed. Personal belongings returned to patient. Surgical dressing clean, dry, intact. Patient denies any further needs.     RN called report to Christopher at [facility] @[phone].    Patient ANTOLIN @[phone]

## 2025-05-08 NOTE — PLAN OF CARE
Problem: Chronic Conditions and Co-morbidities  Goal: Patient's chronic conditions and co-morbidity symptoms are monitored and maintained or improved  5/8/2025 1049 by Fer Cha RN  Outcome: Completed  Flowsheets (Taken 5/8/2025 0830)  Care Plan - Patient's Chronic Conditions and Co-Morbidity Symptoms are Monitored and Maintained or Improved:   Monitor and assess patient's chronic conditions and comorbid symptoms for stability, deterioration, or improvement   Collaborate with multidisciplinary team to address chronic and comorbid conditions and prevent exacerbation or deterioration   Update acute care plan with appropriate goals if chronic or comorbid symptoms are exacerbated and prevent overall improvement and discharge     Problem: Skin/Tissue Integrity  Goal: Skin integrity remains intact  Description: 1.  Monitor for areas of redness and/or skin breakdown2.  Assess vascular access sites hourly3.  Every 4-6 hours minimum:  Change oxygen saturation probe site4.  Every 4-6 hours:  If on nasal continuous positive airway pressure, respiratory therapy assess nares and determine need for appliance change or resting period  5/8/2025 1049 by Fer Cha RN  Outcome: Completed  Flowsheets (Taken 5/8/2025 0830)  Skin Integrity Remains Intact: Monitor for areas of redness and/or skin breakdown     Problem: Safety - Adult  Goal: Free from fall injury  5/8/2025 1049 by Fer Cha RN  Outcome: Completed     Problem: Discharge Planning  Goal: Discharge to home or other facility with appropriate resources  5/8/2025 1049 by Fer Cha RN  Outcome: Completed  Flowsheets (Taken 5/8/2025 0830)  Discharge to home or other facility with appropriate resources:   Identify barriers to discharge with patient and caregiver   Arrange for needed discharge resources and transportation as appropriate   Identify discharge learning needs (meds, wound care, etc)     Problem: Pain  Goal: Verbalizes/displays  adequate comfort level or baseline comfort level  5/8/2025 1049 by Fer Cha RN  Outcome: Completed  Flowsheets (Taken 5/8/2025 0830)  Verbalizes/displays adequate comfort level or baseline comfort level:   Encourage patient to monitor pain and request assistance   Assess pain using appropriate pain scale   Administer analgesics based on type and severity of pain and evaluate response   Consider cultural and social influences on pain and pain management   Implement non-pharmacological measures as appropriate and evaluate response   Notify Licensed Independent Practitioner if interventions unsuccessful or patient reports new pain

## 2025-05-08 NOTE — CARE COORDINATION
11:05 Notified Paula  at [facility] intake that we have authorization. I will fax copy to her fax at [phone]-. I will set up transport for 1300 today   1130 Faxed Auth to Paula at [facility].Placed copy of auth in chart

## 2025-05-08 NOTE — CARE COORDINATION
IMM letter provided to patient.  Patient offered four hours to make informed decision regarding appeal process; patient agreeable to discharge.      Patient updated transport requested @1pm patient agreeable.

## 2025-05-08 NOTE — DISCHARGE SUMMARY
[facility]  Office: [phone]  Jonah Boston DO, Vahe Dupont DO, Naveed Callejas DO, Hammad Jenkins DO, Federico Díaz MD, Lauren Benjamin MD, Georges Le MD, Babita Mccann MD,  Gurdeep Barcenas MD, Sabas Rai MD, Altagracia Santillan MD,  Patrick Barnett DO, Jerrell Dawson MD, Farzad Nelson MD, Sung Boston DO, Kesha Luna MD,  Nick Lopez DO, Glenis Ceballos MD, Natty Begum MD, Era Olivares MD,  Jakob Seaman MD, Dharmesh Hill MD, Naomy Esparza MD, Tamie Jensen MD, Agustín Taylor MD, Torin Olmedo MD, Elian Martínez DO, Arline Galvez MD, David Philippe MD, Patrick Benítez MD, Mohsin Reza, MD, Arturo Stafford MD, Shirley Waterhouse, CNP,  Kristin Siddiqui, CNP, Elian Zhao, CNP,  Chio Ortega, [facility] [place] Bethany Mendez, CNP, Ann Oro, CNP, Aniya Tran, CNP, Nandini Pena, CNP, Tiana Le, PA-C, Keren Gonzalez, CNP, Francisco Dela Cruz, CNP,  Elma James, CNP, Yudi Dwyer, CNP, Jakob Redmond, PA-C, Chrissy Sweet, CNP,  Precious Galindo, CNS, Sabrina Sin, CNP, Charlotte Thomson, CNP,   Rylee Abarca, CNP           Discharge Summary     Patient ID: Eliceo Madden  :  1945   MRN: [id number]     ACCOUNT:  [id number]   Patient's PCP: Sal Gifford DO  Admit Date: 2025   Discharge Date: 25   Length of Stay: 4  Code Status:  Full Code  Admitting Physician: Naomy Samaniego Sra, MD  Discharge Physician: Arline Galvez MD     Active Discharge Diagnoses:     Primary Problem  Hip fracture requiring operative repair, left, closed, initial encounter ([facility])      Hospital Problems  Active Hospital Problems    Diagnosis Date Noted    Closed fracture of left hip (HCC) [S72.002A] 2025    Hip fracture requiring operative repair, left, closed, initial encounter ([facility]) [S72.002A] 2025    Anemia [D64.9] 2024    Hypothyroidism [E03.9] 2024    Chronic hypotension [I95.89] 2024    Atrial fibrillation (HCC) [I48.91] 2024    Cirrhosis of  liver (HCC) [K74.60] 05/25/2017    Mixed hyperlipidemia [E78.2] 04/03/2017       Admission Condition:  fair     Discharged Condition: fair    Hospital Stay:     Hospital Course:  Eliceo Madden is a 80 y.o. male who was admitted for the management of Hip fracture requiring operative repair, left, closed, initial encounter ([facility]) , presented to ER with Hip Pain (Patient reports getting up out of chair - turned too quickly and lost balance falling and injuring L hip. )    80-year-old male with history of essential hypertension, hyperlipidemia, persistent atrial fibrillation, moderate MR/TR hypothyroidism, liver cirrhosis, chronic hypertension , diffuse large B-cell lymphoma, chronic anemia  Presented with left-sided hip pain after a fall  Found to be tachycardic on arrival hemoglobin 9.2, CT head, cervical spine negative,   hip x-ray left side showed nondisplaced intertrochanteric fracture with moderate varus angulation, suspected nondisplaced fracture of the left inferior pubic ramus    Orthopedic surgery consulted patient underwent trochanteric nail fixation on 5/5  Postop course complicated by worsening of anemia hemoglobin down to 6.7.  Patient was given 1 unit PRBC transfusion with subsequent stabilization of hemoglobin.  He was also given 1 dose IV Venofer before discharge.    Eliquis was resumed prior to discharge  Patient was able to tolerate diet, passed urine and having bowel movements prior to discharge    Patient seen and examined on the day of discharge    General appearance:  alert, cooperative and no distress  Eyes: Anicteric sclera. Pupils are equally round and reactive to light.  Extraocular movements are intact.  Lungs:  clear to auscultation bilaterally, normal effort  Heart:  regular rate and rhythm, no murmur  Abdomen:  soft, nontender, nondistended, normal bowel sounds, no masses,   Extremities: Postop changes to left hip no edema, redness, tenderness in the calves  Skin:  no gross lesions,

## 2025-05-12 ENCOUNTER — HOSPITAL ENCOUNTER (OUTPATIENT)
Age: 80
Setting detail: SPECIMEN
Discharge: HOME | End: 2025-05-12

## 2025-05-12 LAB
ANION GAP SERPL CALCULATED.3IONS-SCNC: 11 MMOL/L (ref 9–16)
BASOPHILS # BLD: 0.04 K/UL (ref 0–0.2)
BASOPHILS NFR BLD: 1 %
BUN SERPL-MCNC: 20 MG/DL (ref 8–23)
CALCIUM SERPL-MCNC: 8.1 MG/DL (ref 8.8–10.2)
CHLORIDE SERPL-SCNC: 106 MMOL/L (ref 98–107)
CO2 SERPL-SCNC: 23 MMOL/L (ref 20–31)
CREAT SERPL-MCNC: 1.1 MG/DL (ref 0.7–1.2)
EOSINOPHIL # BLD: 0.12 K/UL (ref 0–0.4)
EOSINOPHILS RELATIVE PERCENT: 3 % (ref 1–4)
ERYTHROCYTE [DISTWIDTH] IN BLOOD BY AUTOMATED COUNT: ABNORMAL % (ref 11.8–14.4)
GFR, ESTIMATED: 68 ML/MIN/1.73M2
GLUCOSE SERPL-MCNC: 97 MG/DL (ref 82–115)
HCT VFR BLD AUTO: 25.9 % (ref 40.7–50.3)
HGB BLD-MCNC: 7.9 G/DL (ref 13–17)
IMM GRANULOCYTES # BLD AUTO: 0.04 K/UL (ref 0–0.3)
IMM GRANULOCYTES NFR BLD: 1 %
IRON SATN MFR SERPL: 21 % (ref 20–55)
IRON SERPL-MCNC: 45 UG/DL (ref 61–157)
LYMPHOCYTES NFR BLD: 0.12 K/UL (ref 1–4.8)
LYMPHOCYTES RELATIVE PERCENT: 3 % (ref 24–44)
MCH RBC QN AUTO: 27.8 PG (ref 25.2–33.5)
MCHC RBC AUTO-ENTMCNC: 30.5 G/DL (ref 28–38)
MCV RBC AUTO: 91.2 FL (ref 82.6–102.9)
MONOCYTES NFR BLD: 0.45 K/UL (ref 0.2–0.8)
MONOCYTES NFR BLD: 11 % (ref 1–7)
MORPHOLOGY: ABNORMAL
MORPHOLOGY: ABNORMAL
NEUTROPHILS NFR BLD: 81 % (ref 36–66)
NEUTS SEG NFR BLD: 3.33 K/UL (ref 1.8–7.7)
PLATELET # BLD AUTO: 181 K/UL (ref 138–453)
PMV BLD AUTO: 9.1 FL (ref 8.1–13.5)
POTASSIUM SERPL-SCNC: 3.8 MMOL/L (ref 3.7–5.3)
RBC # BLD AUTO: 2.84 M/UL (ref 4.21–5.77)
SODIUM SERPL-SCNC: 140 MMOL/L (ref 136–145)
TIBC SERPL-MCNC: 217 UG/DL (ref 250–450)
UNSATURATED IRON BINDING CAPACITY: 172 UG/DL (ref 112–347)
WBC OTHER # BLD: 4.1 K/UL (ref 3.5–11.3)

## 2025-05-12 PROCEDURE — 83540 ASSAY OF IRON: CPT

## 2025-05-12 PROCEDURE — 83550 IRON BINDING TEST: CPT

## 2025-05-12 PROCEDURE — 80048 BASIC METABOLIC PNL TOTAL CA: CPT

## 2025-05-12 PROCEDURE — 85025 COMPLETE CBC W/AUTO DIFF WBC: CPT

## 2025-05-12 PROCEDURE — 36415 COLL VENOUS BLD VENIPUNCTURE: CPT

## 2025-05-12 NOTE — PROGRESS NOTES
Physician Progress Note      PATIENT:               ORTEGA VELASCO  CSN #:                  [id number]  :                       1945  ADMIT DATE:       2025 5:37 PM  DISCH DATE:        2025 1:28 PM  RESPONDING  PROVIDER #:        Arline Galvez MD          QUERY TEXT:    A past history of Congestive Heart Failure is documented in the medical record   H&P an dprogress notes - listed under past  medical history section Please document the type and acuity:    The clinical indicators include:  Patient admitted with left femur fracture s/p surgery. Noted documentation of   CHF located in past medical history section of medical record. co morbid   conditions of age of 80, hld,and a fib . home medications noted of Aldactone.   Last BNP in record is from  stating 5,021. Echo from  notes Systolic   function is low normal to mildly decreased with an ejection fraction of   50-55%.  Options provided:  -- Chronic Systolic CHF/HFrEF  -- Chronic Diastolic CHF/HFpEF  -- Chronic Systolic and Diastolic CHF  -- Other - I will add my own diagnosis  -- Disagree - Not applicable / Not valid  -- Disagree - Clinically unable to determine / Unknown  -- Refer to Clinical Documentation Reviewer    PROVIDER RESPONSE TEXT:    This patient has chronic systolic CHF/HFrEF.    Query created by: Sury Liu on 2025 5:57 AM      Electronically signed by:  Arline Galvez MD 2025 3:44 PM

## 2025-05-16 DIAGNOSIS — S72.002D CLOSED FRACTURE OF LEFT HIP WITH ROUTINE HEALING, SUBSEQUENT ENCOUNTER: Primary | ICD-10-CM

## 2025-05-20 ENCOUNTER — OFFICE VISIT (OUTPATIENT)
Age: 80
End: 2025-05-20

## 2025-05-20 VITALS — WEIGHT: 185 LBS | HEIGHT: 71 IN | BODY MASS INDEX: 25.9 KG/M2

## 2025-05-20 DIAGNOSIS — S72.002A HIP FRACTURE REQUIRING OPERATIVE REPAIR, LEFT, CLOSED, INITIAL ENCOUNTER (HCC): Primary | ICD-10-CM

## 2025-05-20 PROCEDURE — 99024 POSTOP FOLLOW-UP VISIT: CPT | Performed by: ORTHOPAEDIC SURGERY

## 2025-05-20 NOTE — PROGRESS NOTES
[facility]      [facility] PHYSIC[facility]ANS [facility] ORTHOPAEDICS AND SPORTS MEDICINE  [address] [place] [address] [address]  [place] [address]  Dept: [phone]  Dept Fax: [phone]    Chief Compliant:  Chief Complaint   Patient presents with    Post-Op Check     1st post  op - Left hip TFNA        History of Present Illness:  5/20/25:This is a pleasant 80 y.o. male who is now 2-week status post left intertrochanteric hip fracture ORIF with a short nail.  He is doing well.  He is living at [place].  He is walking 40 feet.  He is on Eliquis 5 mg twice daily to treat medical conditions.  This suffices for his DVT prophylaxis.  He states PT is going well.  He is treating his pain with just Tylenol.    Physical Exam: The left hip incisions are well-healed, no erythema, no drainage.  He has no pain with passive range of motion of the hip.  He has weak active hip flexion.  His calf is soft and nontender.    Imaging: X-rays of the left hip 2 views taken by mobile x-ray were reviewed and show good alignment of the fracture and no hardware failure.      Assessment and Plan:    This is a pleasant 80 y.o. male who is status post the above.  He is doing well.  Continue with his therapy.  He is on Eliquis 5 mg at baseline.  I would like to see him back in 6 weeks with repeat x-rays of the left hip.         Past History:    Current Outpatient Medications:     midodrine (PROAMATINE) 10 MG tablet, Take 1 tablet by mouth in the morning, at noon, in the evening, and at bedtime, Disp: 90 tablet, Rfl: 3    amiodarone (PACERONE) 100 MG tablet, Take 1 tablet by mouth daily, Disp: 30 tablet, Rfl: 2    spironolactone (ALDACTONE) 25 MG tablet, Take 1 tablet by mouth daily, Disp: 30 tablet, Rfl: 3    fludrocortisone (FLORINEF) 0.1 MG tablet, Take 1 tablet by mouth daily, Disp: , Rfl:     apixaban (ELIQUIS) 5 MG TABS tablet, Take 1 tablet by mouth 2 times daily, Disp: , Rfl:

## 2025-05-22 ENCOUNTER — HOSPITAL ENCOUNTER (OUTPATIENT)
Age: 80
Setting detail: SPECIMEN
Discharge: HOME | End: 2025-05-22

## 2025-05-22 LAB
ANION GAP SERPL CALCULATED.3IONS-SCNC: 11 MMOL/L (ref 9–16)
BASOPHILS # BLD: 0.04 K/UL (ref 0–0.2)
BASOPHILS NFR BLD: 1 % (ref 0–2)
BUN SERPL-MCNC: 23 MG/DL (ref 8–23)
CALCIUM SERPL-MCNC: 8.2 MG/DL (ref 8.8–10.2)
CHLORIDE SERPL-SCNC: 106 MMOL/L (ref 98–107)
CO2 SERPL-SCNC: 23 MMOL/L (ref 20–31)
CREAT SERPL-MCNC: 1 MG/DL (ref 0.7–1.2)
EOSINOPHIL # BLD: 0.09 K/UL (ref 0–0.44)
EOSINOPHILS RELATIVE PERCENT: 2 % (ref 1–4)
ERYTHROCYTE [DISTWIDTH] IN BLOOD BY AUTOMATED COUNT: ABNORMAL % (ref 11.8–14.4)
GFR, ESTIMATED: 72 ML/MIN/1.73M2
GLUCOSE SERPL-MCNC: 78 MG/DL (ref 82–115)
HCT VFR BLD AUTO: 26.9 % (ref 40.7–50.3)
HGB BLD-MCNC: 8.4 G/DL (ref 13–17)
IMM GRANULOCYTES # BLD AUTO: 0 K/UL (ref 0–0.3)
IMM GRANULOCYTES NFR BLD: 0 %
LYMPHOCYTES NFR BLD: 0.22 K/UL (ref 1.1–3.7)
LYMPHOCYTES RELATIVE PERCENT: 5 % (ref 24–43)
MCH RBC QN AUTO: 29.4 PG (ref 25.2–33.5)
MCHC RBC AUTO-ENTMCNC: 31.2 G/DL (ref 28–38)
MCV RBC AUTO: 94.1 FL (ref 82.6–102.9)
MONOCYTES NFR BLD: 0.6 K/UL (ref 0.1–1.2)
MONOCYTES NFR BLD: 14 % (ref 3–12)
MORPHOLOGY: ABNORMAL
NEUTROPHILS NFR BLD: 78 % (ref 36–65)
NEUTS SEG NFR BLD: 3.35 K/UL (ref 1.5–8.1)
PLATELET # BLD AUTO: 235 K/UL (ref 138–453)
PMV BLD AUTO: 9 FL (ref 8.1–13.5)
POTASSIUM SERPL-SCNC: 4.3 MMOL/L (ref 3.7–5.3)
RBC # BLD AUTO: 2.86 M/UL (ref 4.21–5.77)
SODIUM SERPL-SCNC: 139 MMOL/L (ref 136–145)
WBC OTHER # BLD: 4.3 K/UL (ref 3.5–11.3)

## 2025-05-22 PROCEDURE — 85025 COMPLETE CBC W/AUTO DIFF WBC: CPT

## 2025-05-22 PROCEDURE — 36415 COLL VENOUS BLD VENIPUNCTURE: CPT

## 2025-05-22 PROCEDURE — 80048 BASIC METABOLIC PNL TOTAL CA: CPT

## 2025-06-27 DIAGNOSIS — S72.002A HIP FRACTURE REQUIRING OPERATIVE REPAIR, LEFT, CLOSED, INITIAL ENCOUNTER (HCC): Primary | ICD-10-CM

## 2025-06-30 ENCOUNTER — HOSPITAL ENCOUNTER (OUTPATIENT)
Dept: GENERAL RADIOLOGY | Age: 80
Discharge: HOME | End: 2025-07-02
Payer: MEDICARE

## 2025-06-30 ENCOUNTER — HOSPITAL ENCOUNTER (OUTPATIENT)
Age: 80
Discharge: HOME | End: 2025-07-02
Payer: MEDICARE

## 2025-06-30 PROCEDURE — 73502 X-RAY EXAM HIP UNI 2-3 VIEWS: CPT

## 2025-07-03 ENCOUNTER — OFFICE VISIT (OUTPATIENT)
Age: 80
End: 2025-07-03

## 2025-07-03 VITALS — HEIGHT: 71 IN | WEIGHT: 185 LBS | BODY MASS INDEX: 25.9 KG/M2

## 2025-07-03 DIAGNOSIS — S72.002A HIP FRACTURE REQUIRING OPERATIVE REPAIR, LEFT, CLOSED, INITIAL ENCOUNTER (HCC): Primary | ICD-10-CM

## 2025-07-03 PROCEDURE — 99024 POSTOP FOLLOW-UP VISIT: CPT | Performed by: ORTHOPAEDIC SURGERY

## 2025-07-03 NOTE — PROGRESS NOTES
[facility]      [facility] PHYSICIANS [facility] ORTHOPAEDICS AND SPORTS MEDICINE  [address] [place] [address] [address]  [place] [address]  Dept: [phone]  Dept Fax: [phone]    Chief Compliant:  Chief Complaint   Patient presents with    Hip Pain     L Hip        History of Present Illness:  7/3/25:This is a pleasant 80 y.o. male who is now 8 weeks status post left intertrochanteric hip fracture ORIF with a TFN.  He is doing excellent.  He is living at home.  He is ambulating with a walker and with a cane.  Pain is minimal.    Physical Exam: The left leg incisions are well-healed.  Hip flexion strength is a little weak.  He walks well with a walker.  His calf is soft and nontender.  The left leg is slightly shorter.    Imaging: X-rays of the left hip show excellent healing of the fracture, no hardware failure.  There has been slight compression at the fracture.      Assessment and Plan:    This is a pleasant 80 y.o. male who is status post the above.  He is doing excellent.  We discussed that his slight leg length discrepancy is normal given his injury.  He can use a shoe lift on the left side if he would like.  He is activity as tolerated.  We discussed reasons to follow-up and he can follow-up as needed.         Past History:    Current Outpatient Medications:     midodrine (PROAMATINE) 10 MG tablet, Take 1 tablet by mouth in the morning, at noon, in the evening, and at bedtime, Disp: 90 tablet, Rfl: 3    amiodarone (PACERONE) 100 MG tablet, Take 1 tablet by mouth daily, Disp: 30 tablet, Rfl: 2    spironolactone (ALDACTONE) 25 MG tablet, Take 1 tablet by mouth daily, Disp: 30 tablet, Rfl: 3    fludrocortisone (FLORINEF) 0.1 MG tablet, Take 1 tablet by mouth daily, Disp: , Rfl:     apixaban (ELIQUIS) 5 MG TABS tablet, Take 1 tablet by mouth 2 times daily, Disp: , Rfl:     levothyroxine (SYNTHROID) 75 MCG tablet, Take 1 tablet by mouth Daily, Disp: , Rfl:

## 2025-07-18 ENCOUNTER — HOSPITAL ENCOUNTER (OUTPATIENT)
Age: 80
Discharge: HOME | End: 2025-07-18
Payer: MEDICARE

## 2025-07-18 DIAGNOSIS — N18.31 STAGE 3A CHRONIC KIDNEY DISEASE (HCC): ICD-10-CM

## 2025-07-18 LAB
ANION GAP SERPL CALCULATED.3IONS-SCNC: 7 MMOL/L (ref 9–16)
BASOPHILS # BLD: 0 K/UL (ref 0–0.2)
BASOPHILS NFR BLD: 0 % (ref 0–2)
BUN SERPL-MCNC: 20 MG/DL (ref 8–23)
CALCIUM SERPL-MCNC: 8.9 MG/DL (ref 8.6–10.4)
CHLORIDE SERPL-SCNC: 107 MMOL/L (ref 98–107)
CO2 SERPL-SCNC: 26 MMOL/L (ref 20–31)
CREAT SERPL-MCNC: 1 MG/DL (ref 0.7–1.2)
CREAT UR-MCNC: 41.5 MG/DL (ref 39–259)
EOSINOPHIL # BLD: 0 K/UL (ref 0–0.4)
EOSINOPHILS RELATIVE PERCENT: 0 % (ref 1–4)
ERYTHROCYTE [DISTWIDTH] IN BLOOD BY AUTOMATED COUNT: 16.1 % (ref 11.8–14.4)
GFR, ESTIMATED: 76 ML/MIN/1.73M2
GLUCOSE SERPL-MCNC: 90 MG/DL (ref 74–99)
HCT VFR BLD AUTO: 33.8 % (ref 40.7–50.3)
HGB BLD-MCNC: 10.8 G/DL (ref 13–17)
IMM GRANULOCYTES # BLD AUTO: 0 K/UL (ref 0–0.3)
IMM GRANULOCYTES NFR BLD: 0 %
LYMPHOCYTES NFR BLD: 0.33 K/UL (ref 1–4.8)
LYMPHOCYTES RELATIVE PERCENT: 7 % (ref 24–44)
MCH RBC QN AUTO: 31.7 PG (ref 25.2–33.5)
MCHC RBC AUTO-ENTMCNC: 32 G/DL (ref 28.4–34.8)
MCV RBC AUTO: 99.1 FL (ref 82.6–102.9)
MONOCYTES NFR BLD: 0.52 K/UL (ref 0.1–0.8)
MONOCYTES NFR BLD: 11 % (ref 1–7)
MORPHOLOGY: ABNORMAL
NEUTROPHILS NFR BLD: 82 % (ref 36–66)
NEUTS SEG NFR BLD: 3.85 K/UL (ref 1.8–7.7)
NRBC BLD-RTO: 0 PER 100 WBC
PLATELET # BLD AUTO: 186 K/UL (ref 138–453)
PMV BLD AUTO: 9.1 FL (ref 8.1–13.5)
POTASSIUM SERPL-SCNC: 5.3 MMOL/L (ref 3.7–5.3)
RBC # BLD AUTO: 3.41 M/UL (ref 4.21–5.77)
SODIUM SERPL-SCNC: 140 MMOL/L (ref 136–145)
TOTAL PROTEIN, URINE: <4 MG/DL
URINE TOTAL PROTEIN CREATININE RATIO: NORMAL (ref 0–0.2)
WBC OTHER # BLD: 4.7 K/UL (ref 3.5–11.3)

## 2025-07-18 PROCEDURE — 84156 ASSAY OF PROTEIN URINE: CPT

## 2025-07-18 PROCEDURE — 36415 COLL VENOUS BLD VENIPUNCTURE: CPT

## 2025-07-18 PROCEDURE — 80048 BASIC METABOLIC PNL TOTAL CA: CPT

## 2025-07-18 PROCEDURE — 85025 COMPLETE CBC W/AUTO DIFF WBC: CPT

## 2025-07-18 PROCEDURE — 82570 ASSAY OF URINE CREATININE: CPT

## (undated) DEVICE — SYRINGE MED 30ML STD CLR PLAS LUERLOCK TIP N CTRL DISP

## (undated) DEVICE — 6619 2 PTNT ISO SYS INCISE AREA&LT;(&GT;&&LT;)&GT;P: Brand: STERI-DRAPE™ IOBAN™ 2

## (undated) DEVICE — STRAP,POSITIONING,KNEE/BODY,FOAM,4X60": Brand: MEDLINE

## (undated) DEVICE — BIT DRL L330MM DIA4.2MM CALIB 100MM 3 FLUT QUIK CPL

## (undated) DEVICE — SYSTEM TISS RETEN TRS

## (undated) DEVICE — C-ARMOR C-ARM EQUIPMENT COVERS CLEAR STERILE UNIVERSAL FIT 12 PER CASE: Brand: C-ARMOR

## (undated) DEVICE — DRESSING,GAUZE,XEROFORM,CURAD,5"X9",ST: Brand: CURAD

## (undated) DEVICE — Device

## (undated) DEVICE — CLOTH PRE OP W9XL10.5IN 2% CHG FRAGRANCE RNS FREE READYPREP

## (undated) DEVICE — MHPB GEN MINOR PACK: Brand: MEDLINE INDUSTRIES, INC.

## (undated) DEVICE — PATIENT CARE KIT ADJ ARM BRD PERINL POST CVR BLU FOAM

## (undated) DEVICE — SUTURE VICRYL + SZ 1 L36IN ABSRB UD L36MM CT-1 1/2 CIR VCP947H

## (undated) DEVICE — GUIDEWIRE ORTH L400MM DIA3.2MM FOR TFN

## (undated) DEVICE — GLOVE SURG SZ 65 THK91MIL LTX FREE SYN POLYISOPRENE

## (undated) DEVICE — STANDARD HYPODERMIC NEEDLE,POLYPROPYLENE HUB: Brand: MONOJECT

## (undated) DEVICE — DRAPE,U/ SHT,SPLIT,PLAS,STERIL: Brand: MEDLINE

## (undated) DEVICE — SOLUTION IRRIG 1000ML 09% SOD CHL USP PIC PLAS CONTAINER

## (undated) DEVICE — APPLICATOR MEDICATED 26 CC SOLUTION HI LT ORNG CHLORAPREP

## (undated) DEVICE — PAD,ABDOMINAL,5"X9",ST,LF,25/BX: Brand: MEDLINE INDUSTRIES, INC.

## (undated) DEVICE — DRESSING TRNSPAR W5XL4.5IN FLM SHT SEMIPERMEABLE WIND

## (undated) DEVICE — SUTURE VICRYL + SZ 2-0 L27IN ABSRB CLR CT-1 1/2 CIR TAPERCUT VCP259H

## (undated) DEVICE — DRESSING TRNSPAR W8XL12IN FLM SURESITE 123

## (undated) DEVICE — LIMB HOLDER, WRIST/ANKLE: Brand: DEROYAL

## (undated) DEVICE — GAUZE,SPONGE,FLUFF,6"X6.75",STRL,5/TRAY: Brand: MEDLINE

## (undated) DEVICE — BLANKET WRM W29.9XL79.1IN UP BODY FORC AIR MISTRAL-AIR

## (undated) DEVICE — DRAPE SURG W41XL74IN CLR FULL SZ C ARM 3 ADH POLY STRP E

## (undated) DEVICE — GLOVE SURG SZ 8 L12IN THK75MIL DK GRN LTX FREE

## (undated) DEVICE — GLOVE ORANGE PI 8   MSG9080